# Patient Record
Sex: MALE | Race: BLACK OR AFRICAN AMERICAN | Employment: OTHER | URBAN - METROPOLITAN AREA
[De-identification: names, ages, dates, MRNs, and addresses within clinical notes are randomized per-mention and may not be internally consistent; named-entity substitution may affect disease eponyms.]

---

## 2017-01-30 ENCOUNTER — HOSPITAL ENCOUNTER (OUTPATIENT)
Dept: MRI IMAGING | Age: 57
Discharge: HOME | End: 2017-01-30
Attending: SPECIALIST | Admitting: SPECIALIST

## 2017-01-30 DIAGNOSIS — M25.569 KNEE MENISCUS PAIN, UNSPECIFIED LATERALITY: ICD-10-CM

## 2017-01-30 DIAGNOSIS — M25.569 PAIN IN KNEE: ICD-10-CM

## 2017-06-30 ENCOUNTER — OFFICE VISIT (OUTPATIENT)
Dept: ORTHOPEDIC SURGERY | Age: 57
End: 2017-06-30

## 2017-06-30 VITALS
RESPIRATION RATE: 16 BRPM | SYSTOLIC BLOOD PRESSURE: 131 MMHG | BODY MASS INDEX: 44.2 KG/M2 | WEIGHT: 275 LBS | HEIGHT: 66 IN | DIASTOLIC BLOOD PRESSURE: 89 MMHG | HEART RATE: 83 BPM | TEMPERATURE: 98.7 F

## 2017-06-30 DIAGNOSIS — M75.22 BICEPS TENDINITIS ON LEFT: ICD-10-CM

## 2017-06-30 DIAGNOSIS — Z72.0 TOBACCO USE: ICD-10-CM

## 2017-06-30 DIAGNOSIS — M75.82 ROTATOR CUFF TENDINITIS, LEFT: Primary | ICD-10-CM

## 2017-06-30 DIAGNOSIS — E66.01 MORBID OBESITY, UNSPECIFIED OBESITY TYPE (HCC): ICD-10-CM

## 2017-06-30 PROCEDURE — G8427 DOCREV CUR MEDS BY ELIG CLIN: HCPCS | Performed by: ORTHOPAEDIC SURGERY

## 2017-06-30 PROCEDURE — 20610 DRAIN/INJ JOINT/BURSA W/O US: CPT | Performed by: ORTHOPAEDIC SURGERY

## 2017-06-30 PROCEDURE — G8417 CALC BMI ABV UP PARAM F/U: HCPCS | Performed by: ORTHOPAEDIC SURGERY

## 2017-06-30 PROCEDURE — 3017F COLORECTAL CA SCREEN DOC REV: CPT | Performed by: ORTHOPAEDIC SURGERY

## 2017-06-30 PROCEDURE — 4004F PT TOBACCO SCREEN RCVD TLK: CPT | Performed by: ORTHOPAEDIC SURGERY

## 2017-06-30 PROCEDURE — 73030 X-RAY EXAM OF SHOULDER: CPT | Performed by: ORTHOPAEDIC SURGERY

## 2017-06-30 PROCEDURE — 99204 OFFICE O/P NEW MOD 45 MIN: CPT | Performed by: ORTHOPAEDIC SURGERY

## 2017-06-30 RX ORDER — IBUPROFEN 200 MG
400 TABLET ORAL EVERY 6 HOURS PRN
COMMUNITY
End: 2019-07-29

## 2017-06-30 RX ORDER — TRAMADOL HYDROCHLORIDE 50 MG/1
50 TABLET ORAL EVERY 6 HOURS PRN
COMMUNITY
End: 2022-02-21

## 2018-06-01 ENCOUNTER — OFFICE VISIT (OUTPATIENT)
Dept: ENT CLINIC | Age: 58
End: 2018-06-01

## 2018-06-01 VITALS
WEIGHT: 268 LBS | HEART RATE: 92 BPM | BODY MASS INDEX: 43.07 KG/M2 | DIASTOLIC BLOOD PRESSURE: 85 MMHG | HEIGHT: 66 IN | SYSTOLIC BLOOD PRESSURE: 120 MMHG

## 2018-06-01 DIAGNOSIS — H91.93 BILATERAL HEARING LOSS, UNSPECIFIED HEARING LOSS TYPE: ICD-10-CM

## 2018-06-01 DIAGNOSIS — H93.13 SUBJECTIVE TINNITUS OF BOTH EARS: Primary | ICD-10-CM

## 2018-06-01 PROCEDURE — 4004F PT TOBACCO SCREEN RCVD TLK: CPT | Performed by: OTOLARYNGOLOGY

## 2018-06-01 PROCEDURE — G8417 CALC BMI ABV UP PARAM F/U: HCPCS | Performed by: OTOLARYNGOLOGY

## 2018-06-01 PROCEDURE — G8427 DOCREV CUR MEDS BY ELIG CLIN: HCPCS | Performed by: OTOLARYNGOLOGY

## 2018-06-01 PROCEDURE — 99203 OFFICE O/P NEW LOW 30 MIN: CPT | Performed by: OTOLARYNGOLOGY

## 2018-06-01 PROCEDURE — 3017F COLORECTAL CA SCREEN DOC REV: CPT | Performed by: OTOLARYNGOLOGY

## 2018-06-01 RX ORDER — LOSARTAN POTASSIUM AND HYDROCHLOROTHIAZIDE 25; 100 MG/1; MG/1
TABLET ORAL
Refills: 4 | COMMUNITY
Start: 2018-04-08

## 2018-06-01 RX ORDER — AMLODIPINE BESYLATE 10 MG/1
TABLET ORAL NIGHTLY
Refills: 4 | COMMUNITY
Start: 2018-04-08

## 2018-06-05 ENCOUNTER — OFFICE VISIT (OUTPATIENT)
Dept: AUDIOLOGY | Age: 58
End: 2018-06-05

## 2018-06-05 DIAGNOSIS — H93.13 TINNITUS OF BOTH EARS: Primary | ICD-10-CM

## 2018-06-05 PROCEDURE — 92557 COMPREHENSIVE HEARING TEST: CPT | Performed by: AUDIOLOGIST

## 2018-06-05 PROCEDURE — 92550 TYMPANOMETRY & REFLEX THRESH: CPT | Performed by: AUDIOLOGIST

## 2018-06-29 ENCOUNTER — OFFICE VISIT (OUTPATIENT)
Dept: ENT CLINIC | Age: 58
End: 2018-06-29

## 2018-06-29 VITALS — SYSTOLIC BLOOD PRESSURE: 126 MMHG | DIASTOLIC BLOOD PRESSURE: 80 MMHG | HEART RATE: 69 BPM

## 2018-06-29 DIAGNOSIS — H93.13 SUBJECTIVE TINNITUS OF BOTH EARS: ICD-10-CM

## 2018-06-29 DIAGNOSIS — H90.3 BILATERAL SENSORINEURAL HEARING LOSS: Primary | ICD-10-CM

## 2018-06-29 PROCEDURE — 3017F COLORECTAL CA SCREEN DOC REV: CPT | Performed by: OTOLARYNGOLOGY

## 2018-06-29 PROCEDURE — G8427 DOCREV CUR MEDS BY ELIG CLIN: HCPCS | Performed by: OTOLARYNGOLOGY

## 2018-06-29 PROCEDURE — 4004F PT TOBACCO SCREEN RCVD TLK: CPT | Performed by: OTOLARYNGOLOGY

## 2018-06-29 PROCEDURE — 99214 OFFICE O/P EST MOD 30 MIN: CPT | Performed by: OTOLARYNGOLOGY

## 2018-06-29 PROCEDURE — G8417 CALC BMI ABV UP PARAM F/U: HCPCS | Performed by: OTOLARYNGOLOGY

## 2018-07-05 ENCOUNTER — HOSPITAL ENCOUNTER (OUTPATIENT)
Dept: PHYSICAL THERAPY | Age: 58
Discharge: HOME | End: 2018-07-31
Admitting: ORTHOPAEDIC SURGERY

## 2018-07-12 ENCOUNTER — HOSPITAL ENCOUNTER (OUTPATIENT)
Dept: PHYSICAL THERAPY | Age: 58
Discharge: HOME | End: 2018-07-13
Admitting: ORTHOPAEDIC SURGERY

## 2018-07-12 NOTE — PLAN OF CARE
[address], [address], [address]  Phone: [phone]   Fax: [phone]                                                     Physical Therapy Certification    Dear Referring Practitioner: Dr. Bert De La Vega,    We had the pleasure of evaluating the following patient for physical therapy services at [address] [place] [address]. A summary of our findings can be found in the initial assessment below. This includes our plan of care. If you have any questions or concerns regarding these findings, please do not hesitate to contact me at the office phone number checked above. Thank you for the referral.       Physician Signature:_______________________________Date:__________________  By signing above (or electronic signature), therapists plan is approved by physician      Patient: Hema Husain   : 1960   MRN: [id number]  Referring Physician: Referring Practitioner: Dr. Bert De La Vega      Evaluation Date: 2018      Medical Diagnosis Information:  Diagnosis: AC joint arthritis   Treatment Diagnosis: S38.533 / A24.342 - B shoulder pain; M25.611 / M25.612 - B shoulder stiffness                                         Insurance information: PT Insurance Information: Medicare / Medicaid - $2010 cap, no auth    Precautions/ Contra-indications: n/a  Latex Allergy:  [x]NO      []YES  Preferred Language for Healthcare:   [x]English       []other:    SUBJECTIVE: Patient stated complaint: B shoulder pain for several months, \"but it seems like it is getting worse. \" per pt, R is worse than L. Pt reports know known incident or cause. Per pt, a week before MD appt, bumped shoulder on the wall and felt R shoulder pop, pain worse since then. Reports constant pain that is worse with certain movements / activities, francisco reaching and lifting.  Reports numbness down to fingertips when he lies down - has to get up and let arm hang down to get relief of numbness. Pt has not tried ice. Pt had a cortisone injection in R shoulder on 7/6/18 - per pt, injection was helping until he lost his balance on the steps and hit his R shoulder on the wall. Relevant Medical History:  OA     Functional Disability Index:PT G-Codes  Functional Assessment Tool Used: UEFI  Score: 41/80 = 49% LOF  Functional Limitation: Carrying, moving and handling objects  Carrying, Moving and Handling Objects Current Status (): At least 40 percent but less than 60 percent impaired, limited or restricted  Carrying, Moving and Handling Objects Goal Status (): At least 1 percent but less than 20 percent impaired, limited or restricted    Pain Scale: 8/10 with movement  Easing factors: Ibuprofen, Tramadol  Provocative factors: reaching forward, reaching back, trying to lift anything     Type: [x]Constant   []Intermittent  []Radiating []Localized []other:     Numbness/Tingling: down arm to fingertips most when lying down, but occasionally when sitting. Seems to happen with inactivity. Reports gets it on L too, but not as bad as right. Occupation/School: disabled  Living Status/Prior Level of Function: Prior to this injury / incident, pt was independent with ADLs and IADLs, household chores, reaching, and lifting      OBJECTIVE:   Hand dominance: right     Palpation:   L shoulder:   R shoulder: (+) moderate to severe TTP in anterior and lateral shoulder, just distal to acromion (francisco at coracoid process).      Functional Mobility/Transfers: unable to use UEs to assist    Posture: rounded shoulders B; moderate increase in thoracic kyphosis    Bandages/Dressings/Incisions: n/a      Dermatomes Normal Abnormal Comments   Top of head (C1)   nt   Posterior occipital region (C2)   nt   Side of neck (C3)   nt   Top of shoulder (C4)  x Diminished sensation on R   Lateral deltoid (C5)  x Diminished sensation on R   Tip of thumb (C6)  x Diminished sensation on R   Distal shoulder/elbow/wrist tendinopathy   []Signs/symptoms consistent with Rotator cuff tear   []Signs/symptoms consistent with labral tear   []Signs/symptoms consistent with postural dysfunction    []Signs/symptoms consistent with Glenohumeral IR Deficit - <45 degrees   []Signs/symptoms consistent with facet dysfunction of cervical/thoracic spine    []Signs/symptoms consistent with pathology which may benefit from Dry needling     [x]other: signs/symptoms consistent with A-C joint arthritis and adhesive capsulitis    Prognosis/Rehab Potential:      []Excellent   [x]Good    []Fair   []Poor    Tolerance of evaluation/treatment:    []Excellent   [x]Good    []Fair   []Poor    Physical Therapy Evaluation Complexity Justification  [x] A history of present problem with:  [] no personal factors and/or comorbidities that impact the plan of care;  [x]1-2 personal factors and/or comorbidities that impact the plan of care  []3 personal factors and/or comorbidities that impact the plan of care  [x] An examination of body systems using standardized tests and measures addressing any of the following: body structures and functions (impairments), activity limitations, and/or participation restrictions;:  [] a total of 1-2 or more elements   [] a total of 3 or more elements   [x] a total of 4 or more elements   [x] A clinical presentation with:  [x] stable and/or uncomplicated characteristics   [] evolving clinical presentation with changing characteristics  [] unstable and unpredictable characteristics;   [x] Clinical decision making of [x] low, [] moderate, [] high complexity using standardized patient assessment instrument and/or measurable assessment of functional outcome.     [x] EVAL (LOW) 49892 (typically 15 minutes face-to-face)  [] EVAL (MOD) 44550 (typically 30 minutes face-to-face)  [] EVAL (HIGH) 52527 (typically 45 minutes face-to-face)  [] RE-EVAL     PLAN:  Frequency/Duration:  2 days per week for 6-8

## 2018-07-17 ENCOUNTER — HOSPITAL ENCOUNTER (OUTPATIENT)
Dept: PHYSICAL THERAPY | Age: 58
Discharge: HOME | End: 2018-07-18
Admitting: ORTHOPAEDIC SURGERY

## 2018-07-17 NOTE — FLOWSHEET NOTE
including ADLs and household chores including lifting with arms close to body without increased symptoms or restriction. 5. Patient will be able to reach overhead and behind back without increased symptoms or restriction. 6. Patient will be able to sleep through the night without being awakened by pain or numbness. Progression Towards Functional goals:  [] Patient is progressing as expected towards functional goals listed. [] Progression is slowed due to complexities listed. [] Progression has been slowed due to co-morbidities. [x] Plan just implemented, too soon to assess goals progression  [] Other:     Persisting Functional Limitations/Impairments:  []Sitting []Standing   []Walking []Squatting/bending    []Stairs [x]ADL's    [x]Transfers [x]Reaching  [x]Housework []Job related tasks  [x]Driving [x]Sports/Recreation   [x]Other: lifting, sleeping    ASSESSMENT:  Pt tolerated tx well. Reported pain in R shoulder with 5# bicep curls, so decreased to 3# and able to complete without pain. PROM is gradually increasing, but continues to be painful, francisco at end range. Pt more tender / painful with manual tx on the R than the L. Pt encouraged to continue compliance with HEP. Pt will continue to benefit from skilled PT for ROM, strength and NM re-ed in order to improve mobility, improve reaching / lifting and increase overall function.    Treatment/Activity Tolerance:  [x] Patient tolerated treatment well [] Patient limited by fatique  [x] Patient limited by pain  [] Patient limited by other medical complications  [] Other:     Prognosis: [x] Good [] Fair  [] Poor    Patient Requires Follow-up: [x] Yes  [] No    Return to Play:    [x]  N/A     []  Stage 1: Intro to Strength   []  Stage 2: Dynamic Strength and Intro to Plyometrics   []  Stage 3: Advanced Plyometrics and Intro to Throwing   []  Stage 4: Sport specific Training/Return to Sport     []  Ready to Return to Play, Meets [employer] [facility] [employer]   []  Not

## 2018-07-19 ENCOUNTER — HOSPITAL ENCOUNTER (OUTPATIENT)
Dept: PHYSICAL THERAPY | Age: 58
Discharge: HOME | End: 2018-07-20
Admitting: ORTHOPAEDIC SURGERY

## 2018-08-01 ENCOUNTER — HOSPITAL ENCOUNTER (OUTPATIENT)
Dept: PHYSICAL THERAPY | Age: 58
Discharge: HOME | End: 2018-08-31
Attending: ORTHOPAEDIC SURGERY | Admitting: ORTHOPAEDIC SURGERY

## 2019-04-08 ENCOUNTER — OFFICE VISIT (OUTPATIENT)
Dept: ORTHOPEDIC SURGERY | Age: 59
End: 2019-04-08
Payer: MEDICARE

## 2019-04-08 VITALS
DIASTOLIC BLOOD PRESSURE: 85 MMHG | BODY MASS INDEX: 43.87 KG/M2 | SYSTOLIC BLOOD PRESSURE: 124 MMHG | WEIGHT: 273 LBS | HEIGHT: 66 IN | HEART RATE: 80 BPM

## 2019-04-08 DIAGNOSIS — M25.511 BILATERAL SHOULDER PAIN, UNSPECIFIED CHRONICITY: ICD-10-CM

## 2019-04-08 DIAGNOSIS — M75.81 RIGHT ROTATOR CUFF TENDINITIS: Primary | ICD-10-CM

## 2019-04-08 DIAGNOSIS — M25.512 BILATERAL SHOULDER PAIN, UNSPECIFIED CHRONICITY: ICD-10-CM

## 2019-04-08 DIAGNOSIS — M75.82 ROTATOR CUFF TENDINITIS, LEFT: ICD-10-CM

## 2019-04-08 DIAGNOSIS — M19.011 ARTHRITIS OF RIGHT ACROMIOCLAVICULAR JOINT: ICD-10-CM

## 2019-04-08 PROCEDURE — 4004F PT TOBACCO SCREEN RCVD TLK: CPT | Performed by: ORTHOPAEDIC SURGERY

## 2019-04-08 PROCEDURE — G8417 CALC BMI ABV UP PARAM F/U: HCPCS | Performed by: ORTHOPAEDIC SURGERY

## 2019-04-08 PROCEDURE — 3017F COLORECTAL CA SCREEN DOC REV: CPT | Performed by: ORTHOPAEDIC SURGERY

## 2019-04-08 PROCEDURE — 20610 DRAIN/INJ JOINT/BURSA W/O US: CPT | Performed by: ORTHOPAEDIC SURGERY

## 2019-04-08 PROCEDURE — G8427 DOCREV CUR MEDS BY ELIG CLIN: HCPCS | Performed by: ORTHOPAEDIC SURGERY

## 2019-04-08 PROCEDURE — 99213 OFFICE O/P EST LOW 20 MIN: CPT | Performed by: ORTHOPAEDIC SURGERY

## 2019-04-08 NOTE — PROGRESS NOTES
The patient returns today for both shoulders. He's been diagnosed with passive a.c. arthrosis and impingement on the right and impingement on the left. He says is getting the point where he can't sleep at night on either shoulder. ROS: Pertinent items are noted in HPI. Depo medrol NDC 4499-1041-42  Lot# [id number]  Exp.1/2021  Shoulder,bilateral    Ropivacaine    NCD#:  [id number]  LOT#:  [id number]  Exp Date:  05/22  Shoulder, bilateral          History reviewed. No pertinent past medical history. History reviewed. No pertinent surgical history. History reviewed. No pertinent family history.       Social History    Socioeconomic History      Marital status: Legally       Spouse name: None      Number of children: None      Years of education: None      Highest education level: None    Occupational History      None    Social Needs      Financial resource strain: None      Food insecurity:        Worry: None        Inability: None      Transportation needs:        Medical: None        Non-medical: None    Tobacco Use      Smoking status: Current Every Day Smoker        Packs/day: 1.00        Years: 41.00        Pack years: 39        Types: Cigarettes        Start date: 1977      Smokeless tobacco: Never Used    Substance and Sexual Activity      Alcohol use: None      Drug use: None      Sexual activity: None    Lifestyle      Physical activity:        Days per week: None        Minutes per session: None      Stress: None    Relationships      Social connections:        Talks on phone: None        Gets together: None        Attends Muslim service: None        Active member of club or organization: None        Attends meetings of clubs or organizations: None        Relationship status: None      Intimate partner violence:        Fear of current or ex partner: None        Emotionally abused: None        Physically abused: None        Forced sexual activity: None    Other Topics      Concerns: tendinitis. Plan: After sterile prep of the right shoulder injected with 80 mg of Depo-Medrol and 15 mg ropivacaine split between a.c. joint and subacromial space. Patient tolerated this procedure well. After sterile prep injected left shoulder with 80 mg of Depo-Medrol and 50 mg ropivacaine into the subacromial space. Patient tolerated this procedure well.

## 2019-05-01 ENCOUNTER — HOSPITAL ENCOUNTER (OUTPATIENT)
Age: 59
Discharge: HOME | End: 2019-05-01
Payer: MEDICARE

## 2019-05-01 ENCOUNTER — HOSPITAL ENCOUNTER (OUTPATIENT)
Dept: GENERAL RADIOLOGY | Age: 59
Discharge: HOME | End: 2019-05-01
Payer: MEDICARE

## 2019-05-01 DIAGNOSIS — R05.9 COUGH: ICD-10-CM

## 2019-05-01 DIAGNOSIS — Z20.1 EXPOSURE TO TB: ICD-10-CM

## 2019-05-01 PROCEDURE — 86480 TB TEST CELL IMMUN MEASURE: CPT

## 2019-05-01 PROCEDURE — 71046 X-RAY EXAM CHEST 2 VIEWS: CPT

## 2019-05-03 LAB
QUANTI TB GOLD PLUS: NEGATIVE
QUANTI TB1 MINUS NIL: 0.03 IU/ML (ref 0–0.34)
QUANTI TB2 MINUS NIL: 0.03 IU/ML (ref 0–0.34)
QUANTIFERON MITOGEN: >10 IU/ML
QUANTIFERON NIL: 0.03 IU/ML

## 2019-05-12 ENCOUNTER — HOSPITAL ENCOUNTER (EMERGENCY)
Age: 59
Discharge: HOME | End: 2019-05-12
Attending: EMERGENCY MEDICINE
Payer: MEDICARE

## 2019-05-12 VITALS
TEMPERATURE: 98.2 F | RESPIRATION RATE: 20 BRPM | BODY MASS INDEX: 43.87 KG/M2 | DIASTOLIC BLOOD PRESSURE: 81 MMHG | HEART RATE: 80 BPM | WEIGHT: 273 LBS | OXYGEN SATURATION: 97 % | SYSTOLIC BLOOD PRESSURE: 128 MMHG | HEIGHT: 66 IN

## 2019-05-12 DIAGNOSIS — M54.30 SCIATICA, UNSPECIFIED LATERALITY: Primary | ICD-10-CM

## 2019-05-12 PROCEDURE — 96374 THER/PROPH/DIAG INJ IV PUSH: CPT

## 2019-05-12 PROCEDURE — 2500000003 HC RX 250 WO HCPCS: Performed by: EMERGENCY MEDICINE

## 2019-05-12 PROCEDURE — 99282 EMERGENCY DEPT VISIT SF MDM: CPT

## 2019-05-12 PROCEDURE — 6360000002 HC RX W HCPCS: Performed by: EMERGENCY MEDICINE

## 2019-05-12 PROCEDURE — 96375 TX/PRO/DX INJ NEW DRUG ADDON: CPT

## 2019-05-12 RX ORDER — MELOXICAM 15 MG/1
15 TABLET ORAL DAILY
COMMUNITY
End: 2019-07-29

## 2019-05-12 RX ORDER — HYDROMORPHONE HYDROCHLORIDE 1 MG/ML
0.5 INJECTION, SOLUTION INTRAMUSCULAR; INTRAVENOUS; SUBCUTANEOUS ONCE
Status: COMPLETED | OUTPATIENT
Start: 2019-05-12 | End: 2019-05-12

## 2019-05-12 RX ORDER — DEXAMETHASONE SODIUM PHOSPHATE 4 MG/ML
4 INJECTION, SOLUTION INTRA-ARTICULAR; INTRALESIONAL; INTRAMUSCULAR; INTRAVENOUS; SOFT TISSUE EVERY 6 HOURS
Status: DISCONTINUED | OUTPATIENT
Start: 2019-05-12 | End: 2019-05-12 | Stop reason: HOSPADM

## 2019-05-12 RX ORDER — CYCLOBENZAPRINE HCL 10 MG
10 TABLET ORAL 3 TIMES DAILY PRN
COMMUNITY
End: 2019-07-29 | Stop reason: ALTCHOICE

## 2019-05-12 RX ADMIN — HYDROMORPHONE HYDROCHLORIDE 0.5 MG: 1 INJECTION, SOLUTION INTRAMUSCULAR; INTRAVENOUS; SUBCUTANEOUS at 02:58

## 2019-05-12 RX ADMIN — DEXAMETHASONE SODIUM PHOSPHATE 4 MG: 4 INJECTION, SOLUTION INTRAMUSCULAR; INTRAVENOUS at 02:58

## 2019-05-12 ASSESSMENT — PAIN DESCRIPTION - PAIN TYPE
TYPE: ACUTE PAIN
TYPE: ACUTE PAIN

## 2019-05-12 ASSESSMENT — PAIN DESCRIPTION - LOCATION: LOCATION: BACK

## 2019-05-12 ASSESSMENT — PAIN DESCRIPTION - ORIENTATION: ORIENTATION: LOWER

## 2019-05-12 ASSESSMENT — PAIN SCALES - GENERAL
PAINLEVEL_OUTOF10: 10
PAINLEVEL_OUTOF10: 6

## 2019-05-12 NOTE — ED PROVIDER NOTES
eMERGENCY dEPARTMENT eNCOUnter      [address] [facility]    Chief Complaint   Patient presents with    Back Pain     Patient presents to ER with complaints of lower back pain that radiates down left leg. Denies urinary symptoms. Patient was seen at [facility], given meloxicam and cyclobezaprine; no relief. JUVE Wu is a 63 y.o. male who presents with low back pain that radiates down the left lower extremity. He states that this hasn't gone over couple days he was seen at the [facility] and given some medication without any relief. He has no weakness in extremities or difficulty urinating. He's never had a history of cancer, no fever, no trauma of the recent fracture. PAST MEDICAL HISTORY    History reviewed. No pertinent past medical history. SURGICAL HISTORY    History reviewed. No pertinent surgical history. CURRENT MEDICATIONS    Current Outpatient Rx   Medication Sig Dispense Refill    cyclobenzaprine (FLEXERIL) 10 MG tablet Take 10 mg by mouth 3 times daily as needed for Muscle spasms      meloxicam (MOBIC) 15 MG tablet Take 15 mg by mouth daily      Ascorbic Acid Buffered (BUFFERED C POWDER PO) Take by mouth      amLODIPine (NORVASC) 10 MG tablet TK 1 T PO QD  4    losartan-hydrochlorothiazide (HYZAAR) 100-25 MG per tablet TK 1 T PO QD  4    diclofenac sodium 1 % GEL MATT 1 GRAM BID  2    vitamin D (CHOLECALCIFEROL) 1000 UNIT TABS tablet Take 1,000 Units by mouth daily      traMADol (ULTRAM) 50 MG tablet Take 50 mg by mouth every 6 hours as needed for Pain      ibuprofen (ADVIL;MOTRIN) 200 MG tablet Take 400 mg by mouth every 6 hours as needed for Pain         ALLERGIES    No Known Allergies    FAMILY HISTORY    History reviewed. No pertinent family history.   Family history and social history reviewed and noncontributory  SOCIAL HISTORY    Social History     Socioeconomic History    Marital status: Legally      Spouse name: None    Number of children: None    Years of education: None    Highest education level: None   Occupational History    None   Social Needs    Financial resource strain: None    Food insecurity:     Worry: None     Inability: None    Transportation needs:     Medical: None     Non-medical: None   Tobacco Use    Smoking status: Current Every Day Smoker     Packs/day: 1.00     Years: 41.00     Pack years: 41.00     Types: Cigarettes     Start date: 1977    Smokeless tobacco: Never Used   Substance and Sexual Activity    Alcohol use: None    Drug use: None    Sexual activity: None   Lifestyle    Physical activity:     Days per week: None     Minutes per session: None    Stress: None   Relationships    Social connections:     Talks on phone: None     Gets together: None     Attends Catholic service: None     Active member of club or organization: None     Attends meetings of clubs or organizations: None     Relationship status: None    Intimate partner violence:     Fear of current or ex partner: None     Emotionally abused: None     Physically abused: None     Forced sexual activity: None   Other Topics Concern    None   Social History Narrative    None       REVIEW OF SYSTEMS    Constitutional:  Denies fever, chills, weight loss or weakness   Eyes:  Denies photophobia or discharge   HENT:  Denies sore throat or ear pain   Respiratory:  Denies cough or shortness of breath   Cardiovascular:  Denies chest pain, palpitations or swelling   GI:  Denies abdominal pain, nausea, vomiting, or diarrhea   Musculoskeletal:  Sharp back pain   Skin:  Denies rash   Neurologic:  Denies headache, focal weakness or sensory changes   Endocrine:  Denies polyuria or polydypsia   Lymphatic:  Denies swollen glands   Psychiatric:  Denies depression, suicidal ideation or homicidal ideation   All systems negative except as marked.      PHYSICAL EXAM    VITAL SIGNS: /81   Pulse 80   Temp 98.2 °F (36.8 °C) (Oral)   Resp 20   Ht 5' 6\" (1.676 m)   Wt 273 lb (123.8 kg)   SpO2 97%   BMI 44.06 kg/m²    Constitutional:  Well developed, Well nourished, No acute distress, Non-toxic appearance. HENT:  Normocephalic, Atraumatic, Bilateral external ears normal, Oropharynx moist, No oral exudates, Nose normal. Neck- Normal range of motion, No tenderness, Supple, No stridor. Eyes:  PERRL, EOMI, Conjunctiva normal, No discharge. Respiratory:  Normal breath sounds, No respiratory distress, No wheezing, No chest tenderness. Cardiovascular:  Normal heart rate, Normal rhythm, No murmurs, No rubs, No gallops. GI:  Bowel sounds normal, Soft, No tenderness, No masses, No pulsatile masses. Musculoskeletal:  Intact distal pulses, No edema, No tenderness, No cyanosis, No clubbing. Good range of motion in all major joints. No tenderness to palpation or major deformities noted. Back- No tenderness. Integument:  Warm, Dry, No erythema, No rash. Lymphatic:  No lymphadenopathy noted. Neurologic:  Alert & oriented x 3, Normal motor function, Normal sensory function, No focal deficits noted. Psychiatric:  Affect normal, Judgment normal, Mood normal.     EKG        RADIOLOGY        PROCEDURES        ED COURSE & MEDICAL DECISION MAKING    Pertinent Labs & Imaging studies reviewed. (See chart for details)  Clinically it is because sciatica. I gave some IV analgesia here in the emergency department. This patient has some sharp left-sided sciatica. He does not have any sign of serious back pain such as fever, weakness in extremity, or history of cancer. I think he can be managed as an outpatient he already has medicine for pain. Follow-up with primary care  FINAL IMPRESSION    1.  Sciatica, unspecified laterality             Luther Ngo MD  05/12/19 2516

## 2019-06-06 ENCOUNTER — OFFICE VISIT (OUTPATIENT)
Dept: ORTHOPEDIC SURGERY | Age: 59
End: 2019-06-06
Payer: MEDICARE

## 2019-06-06 VITALS
DIASTOLIC BLOOD PRESSURE: 53 MMHG | HEIGHT: 65 IN | BODY MASS INDEX: 44.15 KG/M2 | WEIGHT: 265 LBS | HEART RATE: 93 BPM | SYSTOLIC BLOOD PRESSURE: 113 MMHG

## 2019-06-06 DIAGNOSIS — M51.37 DEGENERATION OF LUMBAR OR LUMBOSACRAL INTERVERTEBRAL DISC: ICD-10-CM

## 2019-06-06 DIAGNOSIS — M25.552 PAIN OF LEFT HIP JOINT: Primary | ICD-10-CM

## 2019-06-06 PROCEDURE — 3017F COLORECTAL CA SCREEN DOC REV: CPT | Performed by: ORTHOPAEDIC SURGERY

## 2019-06-06 PROCEDURE — 4004F PT TOBACCO SCREEN RCVD TLK: CPT | Performed by: ORTHOPAEDIC SURGERY

## 2019-06-06 PROCEDURE — 99213 OFFICE O/P EST LOW 20 MIN: CPT | Performed by: ORTHOPAEDIC SURGERY

## 2019-06-06 PROCEDURE — G8417 CALC BMI ABV UP PARAM F/U: HCPCS | Performed by: ORTHOPAEDIC SURGERY

## 2019-06-06 PROCEDURE — G8427 DOCREV CUR MEDS BY ELIG CLIN: HCPCS | Performed by: ORTHOPAEDIC SURGERY

## 2019-06-06 RX ORDER — METHYLPREDNISOLONE 4 MG/1
TABLET ORAL
Qty: 1 KIT | Refills: 1 | Status: SHIPPED | OUTPATIENT
Start: 2019-06-06 | End: 2019-06-12

## 2019-06-06 NOTE — PROGRESS NOTES
wishes and have on the right and he has some trochanteric tenderness. He has been more sciatic notch tenderness. He has tenderness in the left paraspinous area and little bit in the midline low lumbar spine. He has no swelling of the left lower extremity. Deep tendon reflexes appear normal at the knee and ankle on both sides. He obtained an AP of the pelvis and a lateral of the lumbar spine. Lumbar spine shows loss of lordosis. In the mid lumbar spine has normal joint spaces. He appears to have almost no joint space however T12-L1. The AP pelvis shows he has normal hip joint space no interosseous lesions or marginal osteophytes. Impression: Left lumbar degenerative disc disease with lumbar radiculopathy. Plan: We'll give this patient a Medrol Dosepak. He is to call and this is finished and if he does not have symptoms we will order a lumbar MRI. He does not feel is improved any in the last month.

## 2019-07-15 ENCOUNTER — OFFICE VISIT (OUTPATIENT)
Dept: ORTHOPEDIC SURGERY | Age: 59
End: 2019-07-15
Payer: MEDICARE

## 2019-07-15 VITALS
SYSTOLIC BLOOD PRESSURE: 140 MMHG | BODY MASS INDEX: 44.15 KG/M2 | DIASTOLIC BLOOD PRESSURE: 95 MMHG | HEART RATE: 76 BPM | HEIGHT: 65 IN | WEIGHT: 265 LBS

## 2019-07-15 DIAGNOSIS — M47.816 FACET ARTHRITIS, DEGENERATIVE, LUMBAR SPINE: ICD-10-CM

## 2019-07-15 DIAGNOSIS — M54.16 LUMBAR RADICULOPATHY: Primary | ICD-10-CM

## 2019-07-15 DIAGNOSIS — M25.552 PAIN OF LEFT HIP JOINT: ICD-10-CM

## 2019-07-15 DIAGNOSIS — M51.36 DEGENERATIVE LUMBAR DISC: ICD-10-CM

## 2019-07-15 DIAGNOSIS — M25.562 LEFT KNEE PAIN, UNSPECIFIED CHRONICITY: ICD-10-CM

## 2019-07-15 PROCEDURE — 99214 OFFICE O/P EST MOD 30 MIN: CPT | Performed by: ORTHOPAEDIC SURGERY

## 2019-07-15 PROCEDURE — G8427 DOCREV CUR MEDS BY ELIG CLIN: HCPCS | Performed by: ORTHOPAEDIC SURGERY

## 2019-07-15 PROCEDURE — 4004F PT TOBACCO SCREEN RCVD TLK: CPT | Performed by: ORTHOPAEDIC SURGERY

## 2019-07-15 PROCEDURE — 3017F COLORECTAL CA SCREEN DOC REV: CPT | Performed by: ORTHOPAEDIC SURGERY

## 2019-07-15 PROCEDURE — G8417 CALC BMI ABV UP PARAM F/U: HCPCS | Performed by: ORTHOPAEDIC SURGERY

## 2019-07-22 ENCOUNTER — HOSPITAL ENCOUNTER (OUTPATIENT)
Dept: MRI IMAGING | Age: 59
Discharge: HOME | End: 2019-07-22
Payer: MEDICARE

## 2019-07-22 DIAGNOSIS — M54.16 LUMBAR RADICULOPATHY: ICD-10-CM

## 2019-07-22 PROCEDURE — 72148 MRI LUMBAR SPINE W/O DYE: CPT

## 2019-07-29 ENCOUNTER — OFFICE VISIT (OUTPATIENT)
Dept: ORTHOPEDIC SURGERY | Age: 59
End: 2019-07-29
Payer: MEDICARE

## 2019-07-29 VITALS
HEIGHT: 66 IN | SYSTOLIC BLOOD PRESSURE: 114 MMHG | DIASTOLIC BLOOD PRESSURE: 71 MMHG | HEART RATE: 86 BPM | WEIGHT: 264 LBS | BODY MASS INDEX: 42.43 KG/M2

## 2019-07-29 DIAGNOSIS — M25.562 LEFT KNEE PAIN, UNSPECIFIED CHRONICITY: ICD-10-CM

## 2019-07-29 DIAGNOSIS — M51.36 DEGENERATIVE LUMBAR DISC: Primary | ICD-10-CM

## 2019-07-29 DIAGNOSIS — M25.552 PAIN OF LEFT HIP JOINT: ICD-10-CM

## 2019-07-29 PROCEDURE — 4004F PT TOBACCO SCREEN RCVD TLK: CPT | Performed by: ORTHOPAEDIC SURGERY

## 2019-07-29 PROCEDURE — G8417 CALC BMI ABV UP PARAM F/U: HCPCS | Performed by: ORTHOPAEDIC SURGERY

## 2019-07-29 PROCEDURE — 99212 OFFICE O/P EST SF 10 MIN: CPT | Performed by: ORTHOPAEDIC SURGERY

## 2019-07-29 PROCEDURE — 3017F COLORECTAL CA SCREEN DOC REV: CPT | Performed by: ORTHOPAEDIC SURGERY

## 2019-07-29 PROCEDURE — G8427 DOCREV CUR MEDS BY ELIG CLIN: HCPCS | Performed by: ORTHOPAEDIC SURGERY

## 2019-07-29 RX ORDER — IBUPROFEN 600 MG/1
TABLET ORAL
Refills: 0 | COMMUNITY
Start: 2019-06-11 | End: 2020-05-11 | Stop reason: ALTCHOICE

## 2019-07-31 ENCOUNTER — OFFICE VISIT (OUTPATIENT)
Dept: ORTHOPEDIC SURGERY | Age: 59
End: 2019-07-31
Payer: MEDICARE

## 2019-07-31 VITALS
DIASTOLIC BLOOD PRESSURE: 86 MMHG | HEIGHT: 66 IN | RESPIRATION RATE: 14 BRPM | WEIGHT: 263.89 LBS | SYSTOLIC BLOOD PRESSURE: 144 MMHG | BODY MASS INDEX: 42.41 KG/M2 | HEART RATE: 93 BPM

## 2019-07-31 DIAGNOSIS — M47.816 SPONDYLOSIS WITHOUT MYELOPATHY OR RADICULOPATHY, LUMBAR REGION: ICD-10-CM

## 2019-07-31 DIAGNOSIS — M51.26 HNP (HERNIATED NUCLEUS PULPOSUS), LUMBAR: Primary | ICD-10-CM

## 2019-07-31 DIAGNOSIS — M54.16 LUMBAR RADICULOPATHY: ICD-10-CM

## 2019-07-31 PROCEDURE — G8417 CALC BMI ABV UP PARAM F/U: HCPCS | Performed by: PHYSICAL MEDICINE & REHABILITATION

## 2019-07-31 PROCEDURE — 99204 OFFICE O/P NEW MOD 45 MIN: CPT | Performed by: PHYSICAL MEDICINE & REHABILITATION

## 2019-07-31 PROCEDURE — G8427 DOCREV CUR MEDS BY ELIG CLIN: HCPCS | Performed by: PHYSICAL MEDICINE & REHABILITATION

## 2019-07-31 RX ORDER — TRAMADOL HYDROCHLORIDE 50 MG/1
50 TABLET ORAL 3 TIMES DAILY
Qty: 21 TABLET | Refills: 0 | Status: SHIPPED | OUTPATIENT
Start: 2019-07-31 | End: 2019-08-07

## 2019-07-31 NOTE — PROGRESS NOTES
Disp: , Rfl:   Allergies:  Patient has no known allergies. Social History:    reports that he has been smoking cigarettes. He started smoking about 42 years ago. He has a 41.00 pack-year smoking history. He has never used smokeless tobacco.  Family History:   History reviewed. No pertinent family history. REVIEW OF SYSTEMS: Full ROS reviewed & scanned from 7/31/2019           PHYSICAL EXAM:    Vitals: Blood pressure (!) 144/86, pulse 93, resp. rate 14, height 5' 5.98\" (1.676 m), weight 263 lb 14.3 oz (119.7 kg). GENERAL EXAM:  · General Apparence: Patient is adequately groomed with no evidence of malnutrition. · Psychiatric: Orientation: The patient is oriented to time, place and person. The patient's mood and affect are appropriate   · Vascular: Examination reveals no swelling and palpation reveals no tenderness in upper or lower extremities. Good capillary refill. · The lymphatic examination of the neck, axillae and groin reveals all areas to be without enlargement or induration   Sensation is intact without deficit in the upper and lower extremities to light touch and pinprick  · Coordination of the upper and lower extremities are normal.    CERVICAL EXAMINATION:  · Inspection: Local inspection shows no step-off or bruising. Cervical alignment is normal. No instability is noted. · Palpation and Percussion: No evidence of tenderness at the midline. Paraspinal tenderness is not present. There is no paraspinal spasm. · Range of Motion:  limited by 25% in all planes due to pain   · Strength: 5/5 bilateral upper extremities  · Special Tests:   Spurling's and Brady's are negative bilaterally. Barillas and Impingement tests are negative bilaterally. · Skin:There are no rashes, ulcerations or lesions. · Reflexes: Bilaterally triceps, biceps and brachioradialis are 2+. Clonus absent bilaterally at the feet. No pathological reflexes are noted.   · Gait & station:  antalgic on the left and no ataxia Rasmussen including:  Conservative care options: physical therapy, ice, medications, bracing, and activity modification. The indications for therapeutic injections. The indications for additional imaging/laboratory studies. The indications for (possible future) interventions. After considering the various options discussed, Storm Green elected to pursue a course of treatment that includes the followin. Medications: OARRS reviewed and appropriate. Low risk on ORT. 4 A's reviewed. Tramadol 50 mg po TID #21. Informed verbal consent was obtained. Goals of the current treatment regimen include: improvement in pain, improvement in overall in physical performance, ability to perform daily activities, work or disability, emotional distress, health care utilization and decreased medication consumption. Progress will be monitored towards achieving/maintaining therapeutic goals:    1. Improving perceived interference of pain with ADL's, ability to perform HEP, continue to improve in overall flexibility, ROM, strength and endurance, ability to do household activities indoor and/or outdoor, work and social/leisure activities. Improve psychosocial and physical functioning. 2. Improving sleep to 6-7 hours a night. Improve mood/ anxiety and depression symptoms    3. Reduction of reliance on opioid analgesia/more appropriate opioid use. Utilization of adjuvant medications as appropriate. Risks and benefits of the medications and alternative treatments have been discussed with the patient. The patient was advised against drinking alcohol with the opioid pain medicines, advised against driving or handling machinery when starting or adjusting the dose of medicines, feeling groggy or drowsy, or if having any cognitive issues related to the current medications. The patient is fully aware of the risk of overdose and death, if medicines are misused and not taken as prescribed.  If the patient develops new symptoms

## 2019-08-05 ENCOUNTER — TELEPHONE (OUTPATIENT)
Dept: ORTHOPEDIC SURGERY | Age: 59
End: 2019-08-05

## 2019-08-12 ENCOUNTER — HOSPITAL ENCOUNTER (OUTPATIENT)
Age: 59
Setting detail: OUTPATIENT SURGERY
Discharge: HOME | End: 2019-08-12
Attending: PHYSICAL MEDICINE & REHABILITATION | Admitting: PHYSICAL MEDICINE & REHABILITATION
Payer: MEDICARE

## 2019-08-12 ENCOUNTER — TELEPHONE (OUTPATIENT)
Dept: ORTHOPEDIC SURGERY | Age: 59
End: 2019-08-12

## 2019-08-12 ENCOUNTER — APPOINTMENT (OUTPATIENT)
Dept: GENERAL RADIOLOGY | Age: 59
End: 2019-08-12
Attending: PHYSICAL MEDICINE & REHABILITATION
Payer: MEDICARE

## 2019-08-12 VITALS
BODY MASS INDEX: 41.78 KG/M2 | TEMPERATURE: 97.1 F | RESPIRATION RATE: 16 BRPM | SYSTOLIC BLOOD PRESSURE: 142 MMHG | WEIGHT: 260 LBS | DIASTOLIC BLOOD PRESSURE: 90 MMHG | HEART RATE: 71 BPM | OXYGEN SATURATION: 99 % | HEIGHT: 66 IN

## 2019-08-12 PROCEDURE — 3610000056 HC PAIN LEVEL 4 BASE (NON-OR): Performed by: PHYSICAL MEDICINE & REHABILITATION

## 2019-08-12 PROCEDURE — 3209999900 FLUORO FOR SURGICAL PROCEDURES

## 2019-08-12 PROCEDURE — 99152 MOD SED SAME PHYS/QHP 5/>YRS: CPT | Performed by: PHYSICAL MEDICINE & REHABILITATION

## 2019-08-12 PROCEDURE — 6360000002 HC RX W HCPCS: Performed by: PHYSICAL MEDICINE & REHABILITATION

## 2019-08-12 PROCEDURE — 2500000003 HC RX 250 WO HCPCS: Performed by: PHYSICAL MEDICINE & REHABILITATION

## 2019-08-12 PROCEDURE — 2709999900 HC NON-CHARGEABLE SUPPLY: Performed by: PHYSICAL MEDICINE & REHABILITATION

## 2019-08-12 PROCEDURE — 3610000057 HC PAIN LEVEL 4 ADDL 15 MIN (NON-OR)

## 2019-08-12 RX ORDER — LIDOCAINE HYDROCHLORIDE 10 MG/ML
INJECTION, SOLUTION INFILTRATION; PERINEURAL
Status: COMPLETED | OUTPATIENT
Start: 2019-08-12 | End: 2019-08-12

## 2019-08-12 RX ORDER — TRAZODONE HYDROCHLORIDE 100 MG/1
100 TABLET ORAL NIGHTLY
COMMUNITY

## 2019-08-12 RX ORDER — OMEPRAZOLE 10 MG/1
10 CAPSULE, DELAYED RELEASE ORAL NIGHTLY
COMMUNITY

## 2019-08-12 RX ORDER — BUPIVACAINE HYDROCHLORIDE 5 MG/ML
INJECTION, SOLUTION PERINEURAL
Status: COMPLETED | OUTPATIENT
Start: 2019-08-12 | End: 2019-08-12

## 2019-08-12 RX ORDER — METHYLPREDNISOLONE ACETATE 80 MG/ML
INJECTION, SUSPENSION INTRA-ARTICULAR; INTRALESIONAL; INTRAMUSCULAR; SOFT TISSUE
Status: COMPLETED | OUTPATIENT
Start: 2019-08-12 | End: 2019-08-12

## 2019-08-12 RX ORDER — MIDAZOLAM HYDROCHLORIDE 1 MG/ML
INJECTION INTRAMUSCULAR; INTRAVENOUS
Status: COMPLETED | OUTPATIENT
Start: 2019-08-12 | End: 2019-08-12

## 2019-08-12 ASSESSMENT — PAIN SCALES - GENERAL
PAINLEVEL_OUTOF10: 0
PAINLEVEL_OUTOF10: 0

## 2019-08-12 ASSESSMENT — PAIN DESCRIPTION - DESCRIPTORS: DESCRIPTORS: NUMBNESS;BURNING

## 2019-08-12 ASSESSMENT — PAIN - FUNCTIONAL ASSESSMENT: PAIN_FUNCTIONAL_ASSESSMENT: 0-10

## 2019-08-12 NOTE — H&P
on file. Vitals: Blood pressure (!) 131/102, pulse 87, temperature 97.1 °F (36.2 °C), resp. rate 19, height 5' 6\" (1.676 m), weight 260 lb (117.9 kg), SpO2 97 %. PHYSICAL EXAM:including affected areas  HENT: Airway patent and reviewed  Cardiovascular: Normal rate, regular rhythm, normal heart sounds. Pulmonary/Chest: No wheezes. No rhonchi. No rales. Abdominal: Soft. Bowel sounds are normal. No distension. Extremities: Moves all extremities equally  Cervical and Lumbar Spine: Painful range of motion, no midline tenderness       Diagnosis:Lumbar radiculopathy  M51.26  M54.16   M47.816    Plan: Proceed with planned procedure      ASA CLASS:         []   I. Normal, healthy adult           []   II.  Mild systemic disease            [x]   III. Severe systemic disease      Mallampati: Mallampati Class II - (soft palate, fauces & uvula are visible)      Sedation plan:   [x]  Local              []  Minimal                  []  General anesthesia    Patient's condition acceptable for planned procedure/sedation. Post Procedure Plan   Return to same level of care   ______________________     The risks and benefits as well as alternatives to the procedure have been discussed with the patient and or family. The patient and or next of kin understands and agrees to proceed.     Brea Flannery M.D.

## 2019-08-15 ENCOUNTER — TELEPHONE (OUTPATIENT)
Dept: ORTHOPEDIC SURGERY | Age: 59
End: 2019-08-15

## 2019-08-15 NOTE — TELEPHONE ENCOUNTER
DOS   08/26/2019  CPT   77699  91324  94775.26    71658   NPR   OP SX AUTH  [id number]  VALID   08/26/2019 - 10/10/2019    LEFT  LEVELS   L4 - L5   PROCEDURE   TRANS FORAMINAL DEEPAK  DR. De Murphy  INSURANCE:   [facility]

## 2019-08-26 ENCOUNTER — HOSPITAL ENCOUNTER (OUTPATIENT)
Age: 59
Setting detail: OUTPATIENT SURGERY
Discharge: HOME | End: 2019-08-26
Attending: PHYSICAL MEDICINE & REHABILITATION | Admitting: PHYSICAL MEDICINE & REHABILITATION
Payer: MEDICARE

## 2019-08-26 ENCOUNTER — APPOINTMENT (OUTPATIENT)
Dept: GENERAL RADIOLOGY | Age: 59
End: 2019-08-26
Attending: PHYSICAL MEDICINE & REHABILITATION
Payer: MEDICARE

## 2019-08-26 VITALS
HEART RATE: 74 BPM | RESPIRATION RATE: 16 BRPM | DIASTOLIC BLOOD PRESSURE: 94 MMHG | TEMPERATURE: 98.2 F | SYSTOLIC BLOOD PRESSURE: 123 MMHG | OXYGEN SATURATION: 99 % | WEIGHT: 264 LBS | BODY MASS INDEX: 42.43 KG/M2 | HEIGHT: 66 IN

## 2019-08-26 PROCEDURE — 6360000002 HC RX W HCPCS: Performed by: PHYSICAL MEDICINE & REHABILITATION

## 2019-08-26 PROCEDURE — 2709999900 HC NON-CHARGEABLE SUPPLY: Performed by: PHYSICAL MEDICINE & REHABILITATION

## 2019-08-26 PROCEDURE — 99152 MOD SED SAME PHYS/QHP 5/>YRS: CPT | Performed by: PHYSICAL MEDICINE & REHABILITATION

## 2019-08-26 PROCEDURE — 2500000003 HC RX 250 WO HCPCS: Performed by: PHYSICAL MEDICINE & REHABILITATION

## 2019-08-26 PROCEDURE — 3209999900 FLUORO FOR SURGICAL PROCEDURES

## 2019-08-26 PROCEDURE — 3610000056 HC PAIN LEVEL 4 BASE (NON-OR): Performed by: PHYSICAL MEDICINE & REHABILITATION

## 2019-08-26 RX ORDER — BUPIVACAINE HYDROCHLORIDE 5 MG/ML
INJECTION, SOLUTION EPIDURAL; INTRACAUDAL
Status: COMPLETED | OUTPATIENT
Start: 2019-08-26 | End: 2019-08-26

## 2019-08-26 RX ORDER — MIDAZOLAM HYDROCHLORIDE 1 MG/ML
INJECTION INTRAMUSCULAR; INTRAVENOUS
Status: COMPLETED | OUTPATIENT
Start: 2019-08-26 | End: 2019-08-26

## 2019-08-26 RX ORDER — LIDOCAINE HYDROCHLORIDE 10 MG/ML
INJECTION, SOLUTION INFILTRATION; PERINEURAL
Status: COMPLETED | OUTPATIENT
Start: 2019-08-26 | End: 2019-08-26

## 2019-08-26 RX ORDER — METHYLPREDNISOLONE ACETATE 80 MG/ML
INJECTION, SUSPENSION INTRA-ARTICULAR; INTRALESIONAL; INTRAMUSCULAR; SOFT TISSUE
Status: COMPLETED | OUTPATIENT
Start: 2019-08-26 | End: 2019-08-26

## 2019-08-26 ASSESSMENT — PAIN SCALES - GENERAL
PAINLEVEL_OUTOF10: 0
PAINLEVEL_OUTOF10: 0

## 2019-08-26 ASSESSMENT — PAIN DESCRIPTION - DESCRIPTORS: DESCRIPTORS: SHOOTING

## 2019-08-26 ASSESSMENT — PAIN - FUNCTIONAL ASSESSMENT: PAIN_FUNCTIONAL_ASSESSMENT: 0-10

## 2019-08-26 NOTE — OP NOTE
Patient:  Leslie Garibay  YOB: 1960  Medical Record #:  [id number]   Place: [address] [place] [address]  Date:  8/26/2019   Physician:  Javier Lockwood MD, DEBBI    Procedure: 1. Transforaminal Lumbar Epidural Steroid Injection -  left L4  CPT 71083          2. Transforaminal Lumbar Epidural Steroid Injection -  left L5  CPT 91592    Pre-Procedure Diagnosis: Lumbar radiculopathy      Post-Procedure Diagnosis: Same    Sedation: Local with 1% Lidocaine 3 ml and 2 mg of IV Versed    EBL: None    Complications: None    Procedure Summary:        The patient was brought to the procedure suite and placed in the prone position. The skin overlying the lumbar spine was prepped and draped in the usual sterile fashion. Using fluoroscopic guidance, the left L4 foramen was identified. Through anesthetized skin, a 22 gauge 3.5 inch curved tip spinal needle was advanced into the foramen. Isovue M 300 was instilled showing an epidurogram/nerve root outline pattern without evidence of vascular or intrathecal spread. Following which, 50 mg of depomedrol mixed with 1 ml of 0.5% Marcaine was instilled. The needle was removed. Using fluoroscopic guidance, the left L5 foramen was identified. Through anesthetized skin, a 22 gauge 3.5 inch curved tip spinal needle was advanced into the foramen. Isovue M 300 was instilled showing an epidurogram/nerve root outline pattern without evidence of vascular or intrathecal spread. Following which, 50 mg of depomedrol mixed with 1 ml of 0.5% Marcaine was instilled. The needle was removed and band-aids were applied. The patient was transferred to the post-operative area in stable condition.

## 2019-09-18 ENCOUNTER — OFFICE VISIT (OUTPATIENT)
Dept: ORTHOPEDIC SURGERY | Age: 59
End: 2019-09-18
Payer: MEDICARE

## 2019-09-18 VITALS
HEIGHT: 66 IN | WEIGHT: 263.89 LBS | HEART RATE: 87 BPM | SYSTOLIC BLOOD PRESSURE: 111 MMHG | BODY MASS INDEX: 42.41 KG/M2 | DIASTOLIC BLOOD PRESSURE: 80 MMHG

## 2019-09-18 DIAGNOSIS — M47.816 SPONDYLOSIS WITHOUT MYELOPATHY OR RADICULOPATHY, LUMBAR REGION: ICD-10-CM

## 2019-09-18 DIAGNOSIS — R20.2 PARESTHESIA OF LEFT LOWER EXTREMITY: ICD-10-CM

## 2019-09-18 DIAGNOSIS — M51.36 DEGENERATIVE LUMBAR DISC: ICD-10-CM

## 2019-09-18 DIAGNOSIS — M54.16 LUMBAR RADICULOPATHY: ICD-10-CM

## 2019-09-18 DIAGNOSIS — M51.26 HNP (HERNIATED NUCLEUS PULPOSUS), LUMBAR: Primary | ICD-10-CM

## 2019-09-18 PROCEDURE — 4004F PT TOBACCO SCREEN RCVD TLK: CPT | Performed by: PHYSICIAN ASSISTANT

## 2019-09-18 PROCEDURE — G8417 CALC BMI ABV UP PARAM F/U: HCPCS | Performed by: PHYSICIAN ASSISTANT

## 2019-09-18 PROCEDURE — 99213 OFFICE O/P EST LOW 20 MIN: CPT | Performed by: PHYSICIAN ASSISTANT

## 2019-09-18 PROCEDURE — G8427 DOCREV CUR MEDS BY ELIG CLIN: HCPCS | Performed by: PHYSICIAN ASSISTANT

## 2019-09-18 PROCEDURE — 3017F COLORECTAL CA SCREEN DOC REV: CPT | Performed by: PHYSICIAN ASSISTANT

## 2019-09-18 RX ORDER — MELOXICAM 15 MG/1
15 TABLET ORAL DAILY
Qty: 30 TABLET | Refills: 0 | Status: SHIPPED | OUTPATIENT
Start: 2019-09-18 | End: 2020-01-10 | Stop reason: ALTCHOICE

## 2019-09-18 RX ORDER — TRAMADOL HYDROCHLORIDE 50 MG/1
50 TABLET ORAL 3 TIMES DAILY
Qty: 21 TABLET | Refills: 0 | Status: SHIPPED | OUTPATIENT
Start: 2019-09-18 | End: 2019-09-25

## 2019-09-18 NOTE — PROGRESS NOTES
Follow up: Dejuan Peter  1960  [id number]      CHIEF COMPLAINT:    Chief Complaint   Patient presents with    Lower Back Pain     F/u Left L4 & Left L5 TF 8/12 & 8/26         HISTORY OF PRESENT ILLNESS:  Mr. Heather Paniagua is a 63 y.o. male returns for a follow up visit for multiple medical problems. His current presenting problems are   1. HNP (herniated nucleus pulposus), lumbar    2. Lumbar radiculopathy    3. Spondylosis without myelopathy or radiculopathy, lumbar region    4. Degenerative lumbar disc    5. Paresthesia of left lower extremity    . As per information/history obtained from the PADT(patient assessment and documentation tool) - He complains of pain in the lower back with radiation to the upper leg Bilateral and lower leg Bilateral He rates the pain 5/10 and describes it as sharp, burning, piercing. Pain is made worse by: movement. He denies side effects from the current pain regimen. Patient reports that since the last follow-up visit the physical functioning is worse, family/social relationships are worse, mood is worse and sleep patterns are worse, and that overall functioning is worse. Patient denies neurological bowel or bladder. Patient presents today for follow up of low back and bilateral leg pain, left greater than right. He recently underwent a left L4 and left L5 transforaminal epidural steroid injection on 8/12/19 and 8/26/19. He notes overall 50% improvement. He states pain level prior to procedures was 9/10 and today he notes pain level to be 5/10. He states pain continues to be exasperated by general day to day activities and movement. He notes no side effects to procedures. He also notes no new injuries since procedures. The patient has continued to have sharp shooting and burning pain down the left leg. The pain will start from the left hip. He describes that he does have some weakness. He does use a cane to ambulate.   He describes that although his lower back congenitally narrowed   spinal canal.   3. 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 Results for orders placed or performed during the hospital encounter of 19   Quantiferon, Incubated   Result Value Ref Range    Quantiferon TB Minus NIL Negative Negative    Quantiferon TB1 Minus NIL 0.03 0.00 - 0.34 IU/mL    Quantiferon TB2 Minus NIL 0.03 0.00 - 0.34 IU/mL    QuantiFERON Mitogen >10.00 IU/mL    QuantiFERON Nil 0.03 IU/mL     Impression:       1. HNP (herniated nucleus pulposus), lumbar    2. Lumbar radiculopathy    3. Spondylosis without myelopathy or radiculopathy, lumbar region    4. Degenerative lumbar disc    5. Paresthesia of left lower extremity        Plan:  Clinical Course: Above diagnoses are worsening    I discussed the diagnosis and the treatment options with Arnaud Drake today. In Summary:  The various treatment options were outlined and discussed with Arnaud Drake including:  Conservative care options: physical therapy, ice, medications, bracing, and activity modification. The indications for therapeutic injections. The indications for additional imaging/laboratory studies. The indications for (possible future) interventions. After considering the various options discussed, Arnaud Drake elected to pursue a course of treatment that includes the followin. Medications:  I will prescribe Meloxicam 15 mg po qd to help with inflammation and reduce pain. CV, GI and Nephro risks were reviewed. 2. PT:  Encouraged to continue with HEP. 3. Further studies:  Obtain EMG to evaluate for paresthesias to rule out a nerve entrapment. LLE. 4. Interventional:  After further imaging is obtained, interventional options will be reviewed and recommended. 5. Follow up:  1-2 weeks      Arnaud Drake was instructed to call the office if his symptoms worsen or if new symptoms appear prior to the next scheduled visit.  He is specifically instructed to contact the office between now & his

## 2019-10-10 ENCOUNTER — OFFICE VISIT (OUTPATIENT)
Dept: ORTHOPEDIC SURGERY | Age: 59
End: 2019-10-10
Payer: MEDICARE

## 2019-10-10 DIAGNOSIS — M79.605 PAIN OF LEFT LOWER EXTREMITY: Primary | ICD-10-CM

## 2019-10-10 PROCEDURE — 95886 MUSC TEST DONE W/N TEST COMP: CPT | Performed by: PHYSICAL MEDICINE & REHABILITATION

## 2019-10-10 PROCEDURE — 95908 NRV CNDJ TST 3-4 STUDIES: CPT | Performed by: PHYSICAL MEDICINE & REHABILITATION

## 2019-10-17 ENCOUNTER — OFFICE VISIT (OUTPATIENT)
Dept: ORTHOPEDIC SURGERY | Age: 59
End: 2019-10-17
Payer: MEDICARE

## 2019-10-17 VITALS — BODY MASS INDEX: 42.41 KG/M2 | WEIGHT: 263.89 LBS | HEIGHT: 66 IN

## 2019-10-17 DIAGNOSIS — M51.36 DDD (DEGENERATIVE DISC DISEASE), LUMBAR: ICD-10-CM

## 2019-10-17 DIAGNOSIS — M48.062 LUMBAR STENOSIS WITH NEUROGENIC CLAUDICATION: Primary | ICD-10-CM

## 2019-10-17 DIAGNOSIS — M47.816 SPONDYLOSIS OF LUMBAR REGION WITHOUT MYELOPATHY OR RADICULOPATHY: ICD-10-CM

## 2019-10-17 PROCEDURE — G8417 CALC BMI ABV UP PARAM F/U: HCPCS | Performed by: PHYSICAL MEDICINE & REHABILITATION

## 2019-10-17 PROCEDURE — G8484 FLU IMMUNIZE NO ADMIN: HCPCS | Performed by: PHYSICAL MEDICINE & REHABILITATION

## 2019-10-17 PROCEDURE — 3017F COLORECTAL CA SCREEN DOC REV: CPT | Performed by: PHYSICAL MEDICINE & REHABILITATION

## 2019-10-17 PROCEDURE — 99214 OFFICE O/P EST MOD 30 MIN: CPT | Performed by: PHYSICAL MEDICINE & REHABILITATION

## 2019-10-17 PROCEDURE — 4004F PT TOBACCO SCREEN RCVD TLK: CPT | Performed by: PHYSICAL MEDICINE & REHABILITATION

## 2019-10-17 PROCEDURE — G8427 DOCREV CUR MEDS BY ELIG CLIN: HCPCS | Performed by: PHYSICAL MEDICINE & REHABILITATION

## 2019-10-17 RX ORDER — MELOXICAM 15 MG/1
15 TABLET ORAL DAILY
Qty: 30 TABLET | Refills: 1 | Status: SHIPPED | OUTPATIENT
Start: 2019-10-17 | End: 2019-12-05

## 2019-12-05 ENCOUNTER — OFFICE VISIT (OUTPATIENT)
Dept: ORTHOPEDIC SURGERY | Age: 59
End: 2019-12-05
Payer: MEDICARE

## 2019-12-05 VITALS
HEIGHT: 66 IN | BODY MASS INDEX: 41.95 KG/M2 | DIASTOLIC BLOOD PRESSURE: 85 MMHG | HEART RATE: 87 BPM | WEIGHT: 261 LBS | SYSTOLIC BLOOD PRESSURE: 139 MMHG

## 2019-12-05 DIAGNOSIS — M75.81 RIGHT ROTATOR CUFF TENDINITIS: ICD-10-CM

## 2019-12-05 DIAGNOSIS — M75.82 ROTATOR CUFF TENDINITIS, LEFT: ICD-10-CM

## 2019-12-05 DIAGNOSIS — M25.512 BILATERAL SHOULDER PAIN, UNSPECIFIED CHRONICITY: Primary | ICD-10-CM

## 2019-12-05 DIAGNOSIS — M25.511 BILATERAL SHOULDER PAIN, UNSPECIFIED CHRONICITY: Primary | ICD-10-CM

## 2019-12-05 PROCEDURE — G8427 DOCREV CUR MEDS BY ELIG CLIN: HCPCS | Performed by: ORTHOPAEDIC SURGERY

## 2019-12-05 PROCEDURE — G8484 FLU IMMUNIZE NO ADMIN: HCPCS | Performed by: ORTHOPAEDIC SURGERY

## 2019-12-05 PROCEDURE — 3017F COLORECTAL CA SCREEN DOC REV: CPT | Performed by: ORTHOPAEDIC SURGERY

## 2019-12-05 PROCEDURE — 4004F PT TOBACCO SCREEN RCVD TLK: CPT | Performed by: ORTHOPAEDIC SURGERY

## 2019-12-05 PROCEDURE — 99213 OFFICE O/P EST LOW 20 MIN: CPT | Performed by: ORTHOPAEDIC SURGERY

## 2019-12-05 PROCEDURE — G8417 CALC BMI ABV UP PARAM F/U: HCPCS | Performed by: ORTHOPAEDIC SURGERY

## 2019-12-05 PROCEDURE — 20610 DRAIN/INJ JOINT/BURSA W/O US: CPT | Performed by: ORTHOPAEDIC SURGERY

## 2019-12-11 RX ORDER — MELOXICAM 15 MG/1
15 TABLET ORAL DAILY
Qty: 30 TABLET | Refills: 0 | Status: SHIPPED | OUTPATIENT
Start: 2019-12-11 | End: 2020-01-10

## 2019-12-19 ENCOUNTER — OFFICE VISIT (OUTPATIENT)
Dept: ORTHOPEDIC SURGERY | Age: 59
End: 2019-12-19
Payer: MEDICARE

## 2019-12-19 VITALS
DIASTOLIC BLOOD PRESSURE: 87 MMHG | HEIGHT: 66 IN | BODY MASS INDEX: 41.95 KG/M2 | HEART RATE: 79 BPM | SYSTOLIC BLOOD PRESSURE: 126 MMHG | WEIGHT: 261.02 LBS

## 2019-12-19 DIAGNOSIS — M51.26 HNP (HERNIATED NUCLEUS PULPOSUS), LUMBAR: Primary | ICD-10-CM

## 2019-12-19 DIAGNOSIS — M54.16 LUMBAR RADICULOPATHY: ICD-10-CM

## 2019-12-19 DIAGNOSIS — M48.062 LUMBAR STENOSIS WITH NEUROGENIC CLAUDICATION: ICD-10-CM

## 2019-12-19 PROCEDURE — G8427 DOCREV CUR MEDS BY ELIG CLIN: HCPCS | Performed by: PHYSICAL MEDICINE & REHABILITATION

## 2019-12-19 PROCEDURE — G8484 FLU IMMUNIZE NO ADMIN: HCPCS | Performed by: PHYSICAL MEDICINE & REHABILITATION

## 2019-12-19 PROCEDURE — 99214 OFFICE O/P EST MOD 30 MIN: CPT | Performed by: PHYSICAL MEDICINE & REHABILITATION

## 2019-12-19 PROCEDURE — G8417 CALC BMI ABV UP PARAM F/U: HCPCS | Performed by: PHYSICAL MEDICINE & REHABILITATION

## 2019-12-19 PROCEDURE — 4004F PT TOBACCO SCREEN RCVD TLK: CPT | Performed by: PHYSICAL MEDICINE & REHABILITATION

## 2019-12-19 PROCEDURE — 3017F COLORECTAL CA SCREEN DOC REV: CPT | Performed by: PHYSICAL MEDICINE & REHABILITATION

## 2019-12-23 ENCOUNTER — TELEPHONE (OUTPATIENT)
Dept: ORTHOPEDIC SURGERY | Age: 59
End: 2019-12-23

## 2019-12-24 ENCOUNTER — TELEPHONE (OUTPATIENT)
Dept: ORTHOPEDIC SURGERY | Age: 59
End: 2019-12-24

## 2019-12-31 ENCOUNTER — TELEPHONE (OUTPATIENT)
Dept: ORTHOPEDIC SURGERY | Age: 59
End: 2019-12-31

## 2020-01-10 ENCOUNTER — TELEPHONE (OUTPATIENT)
Dept: ORTHOPEDIC SURGERY | Age: 60
End: 2020-01-10

## 2020-01-10 RX ORDER — MELOXICAM 15 MG/1
15 TABLET ORAL DAILY
Qty: 30 TABLET | Refills: 0 | Status: SHIPPED | OUTPATIENT
Start: 2020-01-10 | End: 2020-02-10

## 2020-01-10 NOTE — TELEPHONE ENCOUNTER
DOS   01/20/2020  CPT   08077  79768  DX   M51.26   M54.16   M48.062  OP SX AUTH  [id number]  VALID   01/20/2020 - 03/05/2020    LEFT  LEVELS   L4  L5   PROCEDURE   TRANS FORAMINAL DEEPAK    [address] [place] [address],[address]:   [address]

## 2020-01-20 ENCOUNTER — TELEPHONE (OUTPATIENT)
Dept: ORTHOPEDIC SURGERY | Age: 60
End: 2020-01-20

## 2020-02-10 ENCOUNTER — TELEPHONE (OUTPATIENT)
Dept: ORTHOPEDIC SURGERY | Age: 60
End: 2020-02-10

## 2020-02-10 RX ORDER — MELOXICAM 15 MG/1
15 TABLET ORAL DAILY
Qty: 30 TABLET | Refills: 0 | Status: SHIPPED | OUTPATIENT
Start: 2020-02-10 | End: 2020-03-10

## 2020-02-12 ENCOUNTER — HOSPITAL ENCOUNTER (OUTPATIENT)
Age: 60
Setting detail: OUTPATIENT SURGERY
Discharge: HOME | End: 2020-02-12
Attending: PHYSICAL MEDICINE & REHABILITATION | Admitting: PHYSICAL MEDICINE & REHABILITATION
Payer: MEDICARE

## 2020-02-12 ENCOUNTER — APPOINTMENT (OUTPATIENT)
Dept: GENERAL RADIOLOGY | Age: 60
End: 2020-02-12
Attending: PHYSICAL MEDICINE & REHABILITATION
Payer: MEDICARE

## 2020-02-12 VITALS
TEMPERATURE: 97.4 F | HEIGHT: 66 IN | RESPIRATION RATE: 16 BRPM | OXYGEN SATURATION: 100 % | HEART RATE: 78 BPM | WEIGHT: 261 LBS | SYSTOLIC BLOOD PRESSURE: 123 MMHG | BODY MASS INDEX: 41.95 KG/M2 | DIASTOLIC BLOOD PRESSURE: 97 MMHG

## 2020-02-12 PROCEDURE — 2500000003 HC RX 250 WO HCPCS: Performed by: PHYSICAL MEDICINE & REHABILITATION

## 2020-02-12 PROCEDURE — 2709999900 HC NON-CHARGEABLE SUPPLY: Performed by: PHYSICAL MEDICINE & REHABILITATION

## 2020-02-12 PROCEDURE — 99152 MOD SED SAME PHYS/QHP 5/>YRS: CPT | Performed by: PHYSICAL MEDICINE & REHABILITATION

## 2020-02-12 PROCEDURE — 3610000056 HC PAIN LEVEL 4 BASE (NON-OR): Performed by: PHYSICAL MEDICINE & REHABILITATION

## 2020-02-12 PROCEDURE — 3209999900 FLUORO FOR SURGICAL PROCEDURES

## 2020-02-12 PROCEDURE — 6360000002 HC RX W HCPCS: Performed by: PHYSICAL MEDICINE & REHABILITATION

## 2020-02-12 RX ORDER — VARENICLINE TARTRATE
KIT
COMMUNITY
Start: 2020-01-10

## 2020-02-12 RX ORDER — BUPIVACAINE HYDROCHLORIDE 5 MG/ML
INJECTION, SOLUTION EPIDURAL; INTRACAUDAL
Status: COMPLETED | OUTPATIENT
Start: 2020-02-12 | End: 2020-02-12

## 2020-02-12 RX ORDER — LIDOCAINE HYDROCHLORIDE 10 MG/ML
INJECTION, SOLUTION INFILTRATION; PERINEURAL
Status: COMPLETED | OUTPATIENT
Start: 2020-02-12 | End: 2020-02-12

## 2020-02-12 RX ORDER — MIDAZOLAM HYDROCHLORIDE 1 MG/ML
INJECTION INTRAMUSCULAR; INTRAVENOUS
Status: COMPLETED | OUTPATIENT
Start: 2020-02-12 | End: 2020-02-12

## 2020-02-12 RX ORDER — BETAMETHASONE SODIUM PHOSPHATE AND BETAMETHASONE ACETATE 3; 3 MG/ML; MG/ML
INJECTION, SUSPENSION INTRA-ARTICULAR; INTRALESIONAL; INTRAMUSCULAR; SOFT TISSUE
Status: COMPLETED | OUTPATIENT
Start: 2020-02-12 | End: 2020-02-12

## 2020-02-12 ASSESSMENT — PAIN DESCRIPTION - ORIENTATION
ORIENTATION: LOWER
ORIENTATION: LOWER

## 2020-02-12 ASSESSMENT — PAIN DESCRIPTION - DIRECTION
RADIATING_TOWARDS: LEFT LEG
RADIATING_TOWARDS: LEFT LEG

## 2020-02-12 ASSESSMENT — PAIN - FUNCTIONAL ASSESSMENT: PAIN_FUNCTIONAL_ASSESSMENT: 0-10

## 2020-02-12 ASSESSMENT — PAIN DESCRIPTION - FREQUENCY
FREQUENCY: CONTINUOUS
FREQUENCY: CONTINUOUS

## 2020-02-12 ASSESSMENT — PAIN DESCRIPTION - PAIN TYPE
TYPE: CHRONIC PAIN
TYPE: CHRONIC PAIN

## 2020-02-12 ASSESSMENT — PAIN SCALES - GENERAL
PAINLEVEL_OUTOF10: 0
PAINLEVEL_OUTOF10: 4
PAINLEVEL_OUTOF10: 4

## 2020-02-12 ASSESSMENT — PAIN DESCRIPTION - LOCATION
LOCATION: BACK
LOCATION: BACK

## 2020-02-12 NOTE — PROGRESS NOTES
IV discontinued, catheter intact, and dressing applied. Procedural dressing dry and intact. Bilateral lower extremities equal in strength. Discharge instructions reviewed with patient or responsible adult, signed and copy given. All home medications have been reviewed. All questions answered and patient or responsible adult verbalized understanding.   PAIN LEVEL AT DISCHARGE __4___

## 2020-03-04 ENCOUNTER — OFFICE VISIT (OUTPATIENT)
Dept: ORTHOPEDIC SURGERY | Age: 60
End: 2020-03-04
Payer: MEDICARE

## 2020-03-04 VITALS
HEIGHT: 66 IN | BODY MASS INDEX: 41.95 KG/M2 | DIASTOLIC BLOOD PRESSURE: 88 MMHG | HEART RATE: 83 BPM | SYSTOLIC BLOOD PRESSURE: 130 MMHG | WEIGHT: 261.02 LBS

## 2020-03-04 PROCEDURE — G8417 CALC BMI ABV UP PARAM F/U: HCPCS | Performed by: PHYSICAL MEDICINE & REHABILITATION

## 2020-03-04 PROCEDURE — 4004F PT TOBACCO SCREEN RCVD TLK: CPT | Performed by: PHYSICAL MEDICINE & REHABILITATION

## 2020-03-04 PROCEDURE — 3017F COLORECTAL CA SCREEN DOC REV: CPT | Performed by: PHYSICAL MEDICINE & REHABILITATION

## 2020-03-04 PROCEDURE — 99213 OFFICE O/P EST LOW 20 MIN: CPT | Performed by: PHYSICAL MEDICINE & REHABILITATION

## 2020-03-04 PROCEDURE — G8484 FLU IMMUNIZE NO ADMIN: HCPCS | Performed by: PHYSICAL MEDICINE & REHABILITATION

## 2020-03-04 PROCEDURE — G8427 DOCREV CUR MEDS BY ELIG CLIN: HCPCS | Performed by: PHYSICAL MEDICINE & REHABILITATION

## 2020-03-04 NOTE — PROGRESS NOTES
Medications:     Current Outpatient Medications:     CHANTIX STARTING MONTH MATT 0.5 MG X 11 & 1 MG X 42 tablet, TK 1 T PO QD UTD, Disp: , Rfl:     meloxicam (MOBIC) 15 MG tablet, TAKE 1 TABLET BY MOUTH DAILY, Disp: 30 tablet, Rfl: 0    omeprazole (PRILOSEC) 10 MG delayed release capsule, Take 10 mg by mouth daily, Disp: , Rfl:     traZODone (DESYREL) 100 MG tablet, Take 100 mg by mouth nightly, Disp: , Rfl:     ibuprofen (ADVIL;MOTRIN) 600 MG tablet, TK 1 T PO QD, Disp: , Rfl: 0    Ascorbic Acid Buffered (BUFFERED C POWDER PO), Take by mouth, Disp: , Rfl:     amLODIPine (NORVASC) 10 MG tablet, TK 1 T PO QD, Disp: , Rfl: 4    losartan-hydrochlorothiazide (HYZAAR) 100-25 MG per tablet, TK 1 T PO QD, Disp: , Rfl: 4    diclofenac sodium 1 % GEL, MATT 1 GRAM BID, Disp: , Rfl: 2    traMADol (ULTRAM) 50 MG tablet, Take 50 mg by mouth every 6 hours as needed for Pain, Disp: , Rfl:     vitamin D (CHOLECALCIFEROL) 1000 UNIT TABS tablet, Take 1,000 Units by mouth daily, Disp: , Rfl:   Allergies:  Patient has no known allergies. Social History:    reports that he has been smoking cigarettes. He started smoking about 43 years ago. He has a 41.00 pack-year smoking history. He has never used smokeless tobacco. He reports that he does not use drugs. Family History:   Family history reviewed with the patient. No pertinent family history to the chief complaint    REVIEW OF SYSTEMS:   CONSTITUTIONAL: Denies unexplained weight loss, fevers, chills or fatigue  NEUROLOGICAL: Denies unsteady gait or progressive weakness  MUSCULOSKELETAL: Denies joint swelling or redness  GI: Denies nausea, vomiting, diarrhea   : Denies bowel or bladder issues       PHYSICAL EXAM:    Vitals: Blood pressure 130/88, pulse 83, height 5' 5.98\" (1.676 m), weight 261 lb 0.4 oz (118.4 kg). GENERAL EXAM:  · General Apparence: Patient is adequately groomed with no evidence of malnutrition.   · Psychiatric: Orientation: The patient is oriented to ataxia  · Additional Examinations:  · RIGHT LOWER EXTREMITY: Inspection/examination of the right lower extremity does not show any tenderness, deformity or injury. Range of motion is normal and pain-free. There is no gross instability. There are no rashes, ulcerations or lesions. Strength and tone are normal. No atrophy or abnormal movements are noted. · LEFT LOWER EXTREMITY:  Inspection/examination of the left lower extremity does not show any tenderness, deformity or injury. Range of motion is normal and pain-free. There is no gross instability. There are no rashes, ulcerations or lesions. Strength and tone are normal. No atrophy or abnormal movements are noted. Diagnostic Testing:    MR Lumbar spine shows    1. Moderate spinal canal stenosis, moderate left and mild right neural   foraminal narrowing at L4-5 secondary to a disc bulge, facet arthropathy and   thickening of the ligamentum flavum superimposed upon a congenitally narrowed   spinal canal.   2. Moderate spinal canal stenosis, moderate left and mild right neural   foraminal narrowing at L3-4 secondary to a disc bulge, facet arthropathy and   thickening of the ligamentum flavum superimposed upon a congenitally narrowed   spinal canal.   3. 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 Results for orders placed or performed during the hospital encounter of 05/01/19   Quantiferon, Incubated   Result Value Ref Range    Quantiferon TB Minus NIL Negative Negative    Quantiferon TB1 Minus NIL 0.03 0.00 - 0.34 IU/mL    Quantiferon TB2 Minus NIL 0.03 0.00 - 0.34 IU/mL    QuantiFERON Mitogen >10.00 IU/mL    QuantiFERON Nil 0.03 IU/mL     Impression:       1. HNP (herniated nucleus pulposus), lumbar    2. Lumbar radiculopathy    3. Lumbar stenosis with neurogenic claudication        Plan:  Clinical Course: Above diagnoses are improving     I discussed the diagnosis and the treatment options with Emperatriz Macdonald today.      In Summary:  The various treatment options were outlined and discussed with Antonio Diane including:  Conservative care options: physical therapy, ice, medications, bracing, and activity modification. The indications for therapeutic injections. The indications for additional imaging/laboratory studies. The indications for (possible future) interventions. After considering the various options discussed, Antonio Diane elected to pursue a course of treatment that includes the followin. Medications:  No further recommendations for new medications. 2. PT:  Prescribed home exercise program.    3. Further studies: No further studies. 4. Interventional:  60% relief after LESI    5. Follow up:  4-6 weeks      Antonio Diane was instructed to call the office if his symptoms worsen or if new symptoms appear prior to the next scheduled visit. He is specifically instructed to contact the office between now & his scheduled appointment if he has concerns related to his condition or if he needs assistance in scheduling the above tests. He is welcome to call for an appointment sooner if he has any additional concerns or questions. Everett Floyd. Tiago Monsivais MD, DEBBI, [facility]  Board Certified in [address] [place] [address]  Certified and Fellowship Trained in [facility] ([facility])             This dictation was performed with a verbal recognition program [facility]) and it was checked for errors. It is possible that there are still dictated errors within this office note. If so, please bring any errors to my attention for an addendum. All efforts were made to ensure that this office note is accurate.

## 2020-03-10 RX ORDER — MELOXICAM 15 MG/1
15 TABLET ORAL DAILY
Qty: 30 TABLET | Refills: 0 | Status: SHIPPED | OUTPATIENT
Start: 2020-03-10 | End: 2020-05-11 | Stop reason: ALTCHOICE

## 2020-04-02 ENCOUNTER — TELEPHONE (OUTPATIENT)
Dept: ORTHOPEDIC SURGERY | Age: 60
End: 2020-04-02

## 2020-04-02 ENCOUNTER — OFFICE VISIT (OUTPATIENT)
Dept: ORTHOPEDIC SURGERY | Age: 60
End: 2020-04-02
Payer: MEDICARE

## 2020-04-02 VITALS
DIASTOLIC BLOOD PRESSURE: 86 MMHG | HEART RATE: 80 BPM | HEIGHT: 66 IN | SYSTOLIC BLOOD PRESSURE: 132 MMHG | BODY MASS INDEX: 41.95 KG/M2 | WEIGHT: 261.02 LBS

## 2020-04-02 PROCEDURE — G8417 CALC BMI ABV UP PARAM F/U: HCPCS | Performed by: PHYSICAL MEDICINE & REHABILITATION

## 2020-04-02 PROCEDURE — 3017F COLORECTAL CA SCREEN DOC REV: CPT | Performed by: PHYSICAL MEDICINE & REHABILITATION

## 2020-04-02 PROCEDURE — G8427 DOCREV CUR MEDS BY ELIG CLIN: HCPCS | Performed by: PHYSICAL MEDICINE & REHABILITATION

## 2020-04-02 PROCEDURE — 99214 OFFICE O/P EST MOD 30 MIN: CPT | Performed by: PHYSICAL MEDICINE & REHABILITATION

## 2020-04-02 PROCEDURE — 4004F PT TOBACCO SCREEN RCVD TLK: CPT | Performed by: PHYSICAL MEDICINE & REHABILITATION

## 2020-04-02 RX ORDER — MELOXICAM 15 MG/1
15 TABLET ORAL DAILY
Qty: 30 TABLET | Refills: 0 | Status: SHIPPED | OUTPATIENT
Start: 2020-04-02 | End: 2020-05-11

## 2020-04-02 RX ORDER — METHYLPREDNISOLONE 4 MG/1
TABLET ORAL
Qty: 1 KIT | Refills: 0 | Status: SHIPPED | OUTPATIENT
Start: 2020-04-02 | End: 2020-09-23

## 2020-04-03 ENCOUNTER — TELEPHONE (OUTPATIENT)
Dept: ORTHOPEDIC SURGERY | Age: 60
End: 2020-04-03

## 2020-04-08 ENCOUNTER — TELEPHONE (OUTPATIENT)
Dept: ORTHOPEDIC SURGERY | Age: 60
End: 2020-04-08

## 2020-05-07 ENCOUNTER — TELEPHONE (OUTPATIENT)
Dept: ORTHOPEDIC SURGERY | Age: 60
End: 2020-05-07

## 2020-05-11 RX ORDER — MELOXICAM 15 MG/1
15 TABLET ORAL DAILY
Qty: 30 TABLET | Refills: 0 | Status: SHIPPED | OUTPATIENT
Start: 2020-05-11 | End: 2020-06-08

## 2020-06-08 RX ORDER — MELOXICAM 15 MG/1
15 TABLET ORAL DAILY
Qty: 30 TABLET | Refills: 0 | Status: SHIPPED | OUTPATIENT
Start: 2020-06-08 | End: 2020-06-09

## 2020-06-08 NOTE — TELEPHONE ENCOUNTER
Patient last seen 20 and medication last filled 20:    Impression:         1. HNP (herniated nucleus pulposus), lumbar    2. Lumbar radiculopathy    3. Lumbar stenosis with neurogenic claudication          Plan:  Clinical Course: Above diagnoses are worsening     I discussed the diagnosis and the treatment options with Jermaine Garciaox today.      In Summary:  The various treatment options were outlined and discussed with Jermaine Garciaox including:  Conservative care options: physical therapy, ice, medications, bracing, and activity modification. The indications for therapeutic injections. The indications for additional imaging/laboratory studies.  The indications for (possible future) interventions.      After considering the various options discussed, Jermaine Rasmussen elected to pursue a course of treatment that includes the followin. Medications:  Recommend to try 1/2 the dose of trazadone  Refill meloxicam 15 mg po daily. Risks, benefits and alternatives were discussed. I will prescribe a medrol dose pack to help with the inflammatory component of pain.  Risks, benefits and alternatives were discussed. Recommended to stop any NSAIDs to reduce risk of GI bleed during the course of the dose pack.       2. PT:  Encouraged to continue with Home exercise program.     3. Further studies: No further studies.        4. Interventional:  We discussed pursuing a Left L4 and L5 TF epidural steroid injection to address the pain.  Radiologic imaging and symptoms confirm the pain etiology.  Risks, benefits and alternatives of interventional options were discussed.  These include and are not limited to bleeding, infection, spinal headache, nerve injury and lack of pain relief.  The patient verbalized understanding and would like to proceed.  The patient will be scheduled accordingly.     5.  Follow up:  2-3 weeks

## 2020-06-09 RX ORDER — MELOXICAM 15 MG/1
15 TABLET ORAL DAILY
Qty: 90 TABLET | Refills: 0 | Status: SHIPPED | OUTPATIENT
Start: 2020-06-09 | End: 2020-09-08

## 2020-08-03 ENCOUNTER — OFFICE VISIT (OUTPATIENT)
Dept: ORTHOPEDIC SURGERY | Age: 60
End: 2020-08-03
Payer: MEDICARE

## 2020-08-03 VITALS — BODY MASS INDEX: 41.95 KG/M2 | TEMPERATURE: 98.6 F | HEIGHT: 66 IN | WEIGHT: 261 LBS

## 2020-08-03 PROCEDURE — 3017F COLORECTAL CA SCREEN DOC REV: CPT | Performed by: ORTHOPAEDIC SURGERY

## 2020-08-03 PROCEDURE — G8417 CALC BMI ABV UP PARAM F/U: HCPCS | Performed by: ORTHOPAEDIC SURGERY

## 2020-08-03 PROCEDURE — 99213 OFFICE O/P EST LOW 20 MIN: CPT | Performed by: ORTHOPAEDIC SURGERY

## 2020-08-03 PROCEDURE — 4004F PT TOBACCO SCREEN RCVD TLK: CPT | Performed by: ORTHOPAEDIC SURGERY

## 2020-08-03 PROCEDURE — G8427 DOCREV CUR MEDS BY ELIG CLIN: HCPCS | Performed by: ORTHOPAEDIC SURGERY

## 2020-08-03 NOTE — PROGRESS NOTES
[facility]  Office Visit  Follow up  Date:  8/3/2020    Name:  Ayaan Nieves  Address:  [address] [place] [address]    :  1960      Age:   62 y.o.    SSN:  [id number]      Medical Record Number:  [id number]    Chief Complaint:    Follow up for bilateral shoulder pain right greater than left    HPI:   Ayaan Nieves is a 62 y.o. male who is following up regarding his bilateral shoulder pain. He states he had bilateral subacromial and AC joint injections this past December. He states he had roughly 50% pain relief for approximately 2 months before his pain returned to the preinjection level. Since then has been using tramadol, meloxicam, topical Voltaren gel with only minimal relief. His pain bothers him greatly at night does have difficulty sleeping. He is currently on disability. He denies any new numbness, tingling, fevers, chills, chest pain, shortness of breath, or any other new significant symptoms.          Past History:  Past Medical History:   Diagnosis Date    Acid reflux     Hypertension        Past Surgical History:   Procedure Laterality Date    LUMBAR SPINE SURGERY Left 2019    LEFT L4 AND L5 TRANSFORAMINAL EPIDURAL STEROID INJECTION WITH FLUOROSCOPY performed by Maximilian Escobar MD at [address] [place] [address] Left 2019    LEFT L4 AND L5 TRANSFORAMINAL EPIDURAL STEROID INJECTION WITH FLUOROSCOPY performed by Maximilian Escobar MD at [address] [place] [address] Left 2020    LEFT L4 AND L5 TRANSFORAMINAL EPDIURAL STEROID INJECTION WITH FLUOROSCOPY (05394, 61475) performed by Maximilian Escobar MD at [place] [address] History     Tobacco Use    Smoking status: Current Every Day Smoker     Packs/day: 1.00     Years: 41.00     Pack years: 41.00     Types: Cigarettes     Start date: 0    Smokeless tobacco: Never Used   Substance Use Topics    Alcohol use: Not on file     Comment: occ    Drug use: Never        Family History:  family history is not on file. Current Outpatient Medications:     meloxicam (MOBIC) 15 MG tablet, TAKE 1 TABLET BY MOUTH DAILY, Disp: 90 tablet, Rfl: 0    methylPREDNISolone (MEDROL, MATT,) 4 MG tablet, Take by mouth as directed., Disp: 1 kit, Rfl: 0    CHANTIX STARTING MONTH MATT 0.5 MG X 11 & 1 MG X 42 tablet, TK 1 T PO QD UTD, Disp: , Rfl:     omeprazole (PRILOSEC) 10 MG delayed release capsule, Take 10 mg by mouth daily, Disp: , Rfl:     traZODone (DESYREL) 100 MG tablet, Take 100 mg by mouth nightly, Disp: , Rfl:     Ascorbic Acid Buffered (BUFFERED C POWDER PO), Take by mouth, Disp: , Rfl:     amLODIPine (NORVASC) 10 MG tablet, TK 1 T PO QD, Disp: , Rfl: 4    losartan-hydrochlorothiazide (HYZAAR) 100-25 MG per tablet, TK 1 T PO QD, Disp: , Rfl: 4    diclofenac sodium 1 % GEL, MATT 1 GRAM BID, Disp: , Rfl: 2    traMADol (ULTRAM) 50 MG tablet, Take 50 mg by mouth every 6 hours as needed for Pain, Disp: , Rfl:     vitamin D (CHOLECALCIFEROL) 1000 UNIT TABS tablet, Take 1,000 Units by mouth daily, Disp: , Rfl:       No Known Allergies      Review of Systems: 10 point review of systems was completed and is negative unless otherwise noted in HPI        Physical Exam:  Temp 98.6 °F (37 °C)   Ht 5' 5.9\" (1.674 m)   Wt 261 lb (118.4 kg)   BMI 42.25 kg/m²       General: No acute distress, well nourished  CV: No obvious peripheral edema. Normal peripheral pulses  Neuro: Alert & oriented x 3  Psych: Normal mood and affect    Right shoulder: Actively the patient has roughly 120 to 30 degrees of forward flexion  120-130 degrees of abduction  He can internally rotate to T12/L1  5 out of 5 strength with internal rotation, external rotation, abduction  Negative Jurgenson's  Negative speeds  Positive Barillas  Tenderness palpation over the [facility] joint    Left shoulder:   Actively the patient has roughly 120 to 30 degrees of forward flexion  120-130 degrees of abduction  He can internally rotate to L1  5 out of 5 strength with internal rotation, external rotation, abduction  Negative Jurgenson's  Negative speeds  Positive Barillas  Tenderness palpation over the [facility] joint      Radiographic:  X-rays obtained and reviewed in office, reviewed and interpreted by me today:  Views: 3 views of the right shoulder, 3 views of the left shoulder  Impression: Bilateral AC joint arthritis, subacromial spurring evident bilaterally as well. Glenohumeral joint well articulated minimal arthritis noted. Assessment:  Heath Austin is a 62 y.o. male with:  1. Bilateral AC joint arthritis  2. Bilateral subacromial impingement    Impression:  Encounter Diagnosis   Name Primary?  Bilateral shoulder pain, unspecified chronicity Yes       Office Procedures:  Orders Placed This Encounter   Procedures    XR SHOULDER LEFT (MIN 2 VIEWS)     Standing Status:   Future     Number of Occurrences:   1     Standing Expiration Date:   8/3/2021     Order Specific Question:   Reason for exam:     Answer:   Pain    XR SHOULDER RIGHT (MIN 2 VIEWS)     Standing Status:   Future     Number of Occurrences:   1     Standing Expiration Date:   8/3/2021     Order Specific Question:   Reason for exam:     Answer:   Pain       Plan:   We did discuss with the patient that his persistent shoulder pain is likely due to his [facility] joint arthritis as well as subacromial impingement and rotator cuff tendinitis. We did discuss the possibility of repeat injections however after further discussion patient would like to discuss possible surgery. We will obtain an MRI of the right shoulder and have him follow-up after this is completed to further discuss his treatment options. [facility]     08/03/20  10:04 AM    The encounter with Heath Austin was supervised by Dr Ileana Conroy who personally examined the patient and reviewed the plan.      This dictation was performed with a verbal recognition program ([employer]ON) and it was checked for errors. It is possible that there are still dictated errors within this office note. If so, please bring any errors to my attention for an addendum. All efforts were made to ensure that this office note is accurate. Attestation:  I was physically present and performed my own examination of this patient and have discussed the case, including pertinent history and exam findings with the fellow. I agree with the documented assessment and plan. Jayro Barney.  El Ledesma MD

## 2020-08-12 ENCOUNTER — HOSPITAL ENCOUNTER (OUTPATIENT)
Dept: MRI IMAGING | Age: 60
Discharge: HOME | End: 2020-08-12
Payer: MEDICARE

## 2020-08-12 PROCEDURE — 73221 MRI JOINT UPR EXTREM W/O DYE: CPT

## 2020-08-31 ENCOUNTER — OFFICE VISIT (OUTPATIENT)
Dept: ORTHOPEDIC SURGERY | Age: 60
End: 2020-08-31
Payer: MEDICARE

## 2020-08-31 VITALS — HEIGHT: 66 IN | BODY MASS INDEX: 41.95 KG/M2 | TEMPERATURE: 97.7 F | WEIGHT: 261 LBS

## 2020-08-31 PROCEDURE — 4004F PT TOBACCO SCREEN RCVD TLK: CPT | Performed by: ORTHOPAEDIC SURGERY

## 2020-08-31 PROCEDURE — 3017F COLORECTAL CA SCREEN DOC REV: CPT | Performed by: ORTHOPAEDIC SURGERY

## 2020-08-31 PROCEDURE — G8417 CALC BMI ABV UP PARAM F/U: HCPCS | Performed by: ORTHOPAEDIC SURGERY

## 2020-08-31 PROCEDURE — 99213 OFFICE O/P EST LOW 20 MIN: CPT | Performed by: ORTHOPAEDIC SURGERY

## 2020-08-31 PROCEDURE — G8427 DOCREV CUR MEDS BY ELIG CLIN: HCPCS | Performed by: ORTHOPAEDIC SURGERY

## 2020-08-31 NOTE — PROGRESS NOTES
The patient returns today for follow-up of right shoulder MRI. Clinically we feel that impingement and AC arthrosis. He has been injected and been to physical therapy. This provided temporary relief and only about 50% pain relief range. ROS: Pertinent items are noted in HPI. No notes on file    Past Medical History:  No date: Acid reflux  No date: Hypertension     Past Surgical History:  8/12/2019: LUMBAR SPINE SURGERY; Left      Comment:  LEFT L4 AND L5 TRANSFORAMINAL EPIDURAL STEROID INJECTION               WITH FLUOROSCOPY performed by Olvin Odonnell MD at [address]  8/26/2019: [address]; Left      Comment:  LEFT L4 AND L5 TRANSFORAMINAL EPIDURAL STEROID INJECTION               WITH FLUOROSCOPY performed by Olvin Odonnell MD at [address]  2/12/2020: [address]; Left      Comment:  LEFT L4 AND L5 TRANSFORAMINAL EPDIURAL STEROID INJECTION               WITH FLUOROSCOPY ([id number], [id number]) performed by Kathrin Nair MD at [address] [place] [address] reviewed. No pertinent family history.       Social History    Socioeconomic History      Marital status: Legally       Spouse name: None      Number of children: None      Years of education: None      Highest education level: None    Occupational History      None    Social Needs      Financial resource strain: None      Food insecurity        Worry: None        Inability: None      Transportation needs        Medical: None        Non-medical: None    Tobacco Use      Smoking status: Current Every Day Smoker        Packs/day: 1.00        Years: 41.00        Pack years: 39        Types: Cigarettes        Start date: 1977      Smokeless tobacco: Never Used    Substance and Sexual Activity      Alcohol use: None        Comment: occ      Drug use: Never      Sexual activity: None    Lifestyle      Physical activity        Days per week: None        Minutes per session: None      Stress: None impingement and biceps tendinosis. Plan: We discussed different options with the patient. He would like to proceed with arthroscopic subacromial decompression, distal clavicle excision, and probable biceps tenotomy. We explained the procedure and recovery. We will try to get the scheduled and see him back preoperatively.

## 2020-09-08 ENCOUNTER — TELEPHONE (OUTPATIENT)
Dept: ORTHOPEDIC SURGERY | Age: 60
End: 2020-09-08

## 2020-09-08 RX ORDER — MELOXICAM 15 MG/1
15 TABLET ORAL DAILY
Qty: 90 TABLET | Refills: 0 | Status: SHIPPED | OUTPATIENT
Start: 2020-09-08 | End: 2022-02-21

## 2020-09-08 NOTE — TELEPHONE ENCOUNTER
Auth: # [id number]    Date: 09/24/20  Type of SX:  OUTPATIENT  Location: [facility]  CPT: B3139713, [id number], [id number]   DX Code: T29.015  SX area: [facility] [address] [facility] [address]: [address] [place] [address] [address] [facility] [address] 09/24/20 TO 11/08/20

## 2020-09-15 ENCOUNTER — TELEPHONE (OUTPATIENT)
Dept: ORTHOPEDIC SURGERY | Age: 60
End: 2020-09-15

## 2020-09-15 NOTE — TELEPHONE ENCOUNTER
Form faxed -- called and spoke with Raz Albert -- surgery packet emailed to her at [email or web address]. Aste[place] -- went over all appts and time

## 2020-09-21 ENCOUNTER — OFFICE VISIT (OUTPATIENT)
Dept: ORTHOPEDIC SURGERY | Age: 60
End: 2020-09-21

## 2020-09-21 VITALS — HEIGHT: 66 IN | BODY MASS INDEX: 41.95 KG/M2 | TEMPERATURE: 98.6 F | WEIGHT: 261 LBS

## 2020-09-21 PROCEDURE — 99024 POSTOP FOLLOW-UP VISIT: CPT | Performed by: ORTHOPAEDIC SURGERY

## 2020-09-21 RX ORDER — HYDROCODONE BITATRATE AND ACETAMINOPHEN 5; 325 MG/1; MG/1
1-2 TABLET ORAL EVERY 4 HOURS PRN
Qty: 40 TABLET | Refills: 0 | Status: SHIPPED | OUTPATIENT
Start: 2020-09-21 | End: 2020-09-28

## 2020-09-21 NOTE — PROGRESS NOTES
Patient is here for preop visit for his right shoulder. He has by MRI and clinical exam AC arthrosis impingement biceps tendinosis. He is scheduled for right shoulder arthroscopy, subacromial decompression, distal clavicle excision, and probable biceps tenotomy. ROS: Pertinent items are noted in HPI. No notes on file    Past Medical History:  No date: Acid reflux  No date: Hypertension     Past Surgical History:  8/12/2019: LUMBAR SPINE SURGERY; Left      Comment:  LEFT L4 AND L5 TRANSFORAMINAL EPIDURAL STEROID INJECTION               WITH FLUOROSCOPY performed by Kleber Hitchcock MD at [address]  8/26/2019: [address]; Left      Comment:  LEFT L4 AND L5 TRANSFORAMINAL EPIDURAL STEROID INJECTION               WITH FLUOROSCOPY performed by Kleber Hitchcock MD at [address]  2/12/2020: [address]; Left      Comment:  LEFT L4 AND L5 TRANSFORAMINAL EPDIURAL STEROID INJECTION               WITH FLUOROSCOPY ([id number], [id number]) performed by Dmitri Barrientos MD at [address]    No family history on file.       Social History    Socioeconomic History      Marital status: Legally       Spouse name: None      Number of children: None      Years of education: None      Highest education level: None    Occupational History      None    Social Needs      Financial resource strain: None      Food insecurity        Worry: None        Inability: None      Transportation needs        Medical: None        Non-medical: None    Tobacco Use      Smoking status: Current Every Day Smoker        Packs/day: 1.00        Years: 41.00        Pack years: 39        Types: Cigarettes        Start date: 1977      Smokeless tobacco: Never Used    Substance and Sexual Activity      Alcohol use: None        Comment: occ      Drug use: Never      Sexual activity: None    Lifestyle      Physical activity        Days per week: None        Minutes per session: None      Stress: None    Relationships      Social connections        Talks on phone: None        Gets together: None        Attends Rastafarian service: None        Active member of club or organization: None        Attends meetings of clubs or organizations: None        Relationship status: None      Intimate partner violence        Fear of current or ex partner: None        Emotionally abused: None        Physically abused: None        Forced sexual activity: None    Other Topics      Concerns:        None    Social History Narrative      None      Current Outpatient Medications:  meloxicam (MOBIC) 15 MG tablet, TAKE 1 TABLET BY MOUTH DAILY, Disp: 90 tablet, Rfl: 0  methylPREDNISolone (MEDROL, MATT,) 4 MG tablet, Take by mouth as directed., Disp: 1 kit, Rfl: 0  CHANTIX STARTING MONTH MATT 0.5 MG X 11 & 1 MG X 42 tablet, TK 1 T PO QD UTD, Disp: , Rfl:   omeprazole (PRILOSEC) 10 MG delayed release capsule, Take 10 mg by mouth daily, Disp: , Rfl:   traZODone (DESYREL) 100 MG tablet, Take 100 mg by mouth nightly, Disp: , Rfl:   Ascorbic Acid Buffered (BUFFERED C POWDER PO), Take by mouth, Disp: , Rfl:   amLODIPine (NORVASC) 10 MG tablet, TK 1 T PO QD, Disp: , Rfl: 4  losartan-hydrochlorothiazide (HYZAAR) 100-25 MG per tablet, TK 1 T PO QD, Disp: , Rfl: 4  diclofenac sodium 1 % GEL, MATT 1 GRAM BID, Disp: , Rfl: 2  traMADol (ULTRAM) 50 MG tablet, Take 50 mg by mouth every 6 hours as needed for Pain, Disp: , Rfl:   vitamin D (CHOLECALCIFEROL) 1000 UNIT TABS tablet, Take 1,000 Units by mouth daily, Disp: , Rfl:     No current facility-administered medications for this visit. No Known Allergies    VITAL SIGNS:  Temp 98.6 °F (37 °C)   Ht 5' 5.9\" (1.674 m)   Wt 261 lb (118.4 kg)   BMI 42.25 kg/m²   We discussed the risks, benefits, and alternatives of surgery, including but not limited to infection, stiffness, DVT, neurovascular damage, and others. Patient seems understand accept these risks. All questions were answered.   He is given a prescription for Norco.  He was asked to take aspirin after surgery. We will see him Thursday for surgery and Friday first postop visit.

## 2020-09-22 ENCOUNTER — TELEPHONE (OUTPATIENT)
Dept: ORTHOPEDIC SURGERY | Age: 60
End: 2020-09-22

## 2020-09-23 ENCOUNTER — ANESTHESIA EVENT (OUTPATIENT)
Dept: OPERATING ROOM | Age: 60
End: 2020-09-23
Payer: MEDICARE

## 2020-09-23 NOTE — PROGRESS NOTES
Name_______________________________________Printed:____________________  Date and time of surgery__9/24/20___0730___________________Arrival Time:___0600  masc_____________   1. The instructions given regarding when and if a patient needs to stop oral intake prior to surgery varies. Follow the specific instructions you were given                  _x__Nothing to eat or to drink after Midnight the night before.                             ____Endoscopy patient follow your DRS instructions-generally you will be doing a part of the prep after Midnight                   ____Carbo loading or ERAS instructions will be given to select patients-if you have been given those instructions -please do the following                           The evening before your surgery after dinner before midnight drink 40 ounces of gatorade. If you are diabetic use sugar free. The morning of surgery drink 40 ounces of water. This needs to be finished 3 hours prior to your surgery start time. 2. Take the following pills with a small sip of water on the morning of surgery__none_________________________________________________                  Do not take blood pressure medications ending in pril or sartan the deondre prior to surgery or the morning of surgery_   3. Aspirin, Ibuprofen, Advil, Naproxen, Vitamin E and other Anti-inflammatory products and supplements should be stopped for 5 -7days before surgery or as directed by your physician. 4. Check with your Doctor regarding stopping Plavix, Coumadin,Eliquis, Lovenox,Effient,Pradaxa,Xarelto, Fragmin or other blood thinners and follow their instructions. 5. Do not smoke, and do not drink any alcoholic beverages 24 hours prior to surgery. This includes NA Beer. Refrain from the usage of any recreational drugs. 6. You may brush your teeth and gargle the morning of surgery. DO NOT SWALLOW WATER   7.  You MUST make arrangements for a responsible adult to stay on site while you are here and take you home after your surgery. You will not be allowed to leave alone or drive yourself home. It is strongly suggested someone stay with you the first 24 hrs. Your surgery will be cancelled if you do not have a ride home. 8. A parent/legal guardian must accompany a child scheduled for surgery and plan to stay at the hospital until the child is discharged. Please do not bring other children with you. 9. Please wear simple, loose fitting clothing to the hospital.  Junior Babak not bring valuables (money, credit cards, checkbooks, etc.) Do not wear any makeup (including no eye makeup) or nail polish on your fingers or toes. 10. DO NOT wear any jewelry or piercings on day of surgery. All body piercing jewelry must be removed. 11. If you have _x__dentures, they will be removed before going to the OR; we will provide you a container. If you wear ___contact lenses or _x__glasses, they will be removed; please bring a case for them. 12. Please see your family doctor/pediatrician for a history & physical and/or concerning medications. Bring any test results/reports from your physician's office. PCP__________________Phone___________H&P Appt. Date________             13 If you  have a Living Will and Durable Power of  for Healthcare, please bring in a copy. 15. Notify your Surgeon if you develop any illness between now and surgery  time, cough, cold, fever, sore throat, nausea, vomiting, etc.  Please notify your surgeon if you experience dizziness, shortness of breath or blurred vision between now & the time of your surgery             15. DO NOT shave your operative site 96 hours prior to surgery. For face & neck surgery, men may use an electric razor 48 hours prior to surgery. 16. Shower the night before or morning of surgery using an antibacterial soap or as you have been instructed.              17. To provide excellent care visitors will be limited to one in the room at any given time. 18.  Please bring picture ID and insurance card. 19.  Visit our web site for additional information:  [email or web address]              20.During flu season no children under the age of 15 are permitted in the hospital for the safety of all patients. 21. If you take a long acting insulin in the evening only  take half of your usual  dose the night  before your procedure              22. If you use a c-pap please bring DOS if staying overnight,             23.For your convenience [address] [facility] has a pharmacy on site to fill your prescriptions. 24. If you use oxygen and have a portable tank please bring it  with you the DOS             25. Bring a complete list of all your medications with name and dose include any supplements. 26. Other__________________________________________   *Please call pre admission testing if you any further questions   Saeed Wilkins [address]    [facility] [place]  [phone]   [facility] [address] [place]ETO   [phone]  Patient instructed to get their COVID-19 test done as directed by their doctor (5-7 days prior to procedure)  or patient states will get on _9/17/20_________. Patient was notified that they need to have an appointment,number to call provided. The day the COVID test is done is considered day one. Instructed to self quarantine after test until DOS. There is a one visitor policy at [facility] for all surgeries and endoscopies. Whether the visitor can stay or will be asked to wait in the car will depend on the current policy and if social distancing can be maintained. The policy is subject to change at any time. Please make sure the visitor has a cell phone that is on,charged and able to accept calls, as this may be the way that the staff communicates with them. Pain management is NO VISITOR policyThe patients ride is expected to remain in the car with a cell phone for communication. If the ride is leaving the hospital grounds please make sure they are back in time for pickup. Have the patient inform the staff on arrival what their rides plans are while the patient is in the facility. At the MAIN there is one visitor allowed. Please note that the visitor policy is subject to change. All above information reviewed with patient in person or by phone. Patient verbalizes understanding. All questions and concerns addressed.                                                                                                  Patient/Rep_patient___________________                                                                                                                                    PRE OP INSTRUCTIONS

## 2020-09-24 ENCOUNTER — ANESTHESIA (OUTPATIENT)
Dept: OPERATING ROOM | Age: 60
End: 2020-09-24
Payer: MEDICARE

## 2020-09-24 ENCOUNTER — HOSPITAL ENCOUNTER (OUTPATIENT)
Age: 60
Setting detail: OUTPATIENT SURGERY
Discharge: HOME | End: 2020-09-24
Attending: ORTHOPAEDIC SURGERY | Admitting: ORTHOPAEDIC SURGERY
Payer: MEDICARE

## 2020-09-24 VITALS
RESPIRATION RATE: 25 BRPM | SYSTOLIC BLOOD PRESSURE: 146 MMHG | DIASTOLIC BLOOD PRESSURE: 99 MMHG | OXYGEN SATURATION: 81 % | TEMPERATURE: 97 F

## 2020-09-24 VITALS
SYSTOLIC BLOOD PRESSURE: 144 MMHG | WEIGHT: 284.4 LBS | OXYGEN SATURATION: 96 % | RESPIRATION RATE: 17 BRPM | HEIGHT: 65 IN | TEMPERATURE: 98 F | HEART RATE: 85 BPM | DIASTOLIC BLOOD PRESSURE: 86 MMHG | BODY MASS INDEX: 47.38 KG/M2

## 2020-09-24 LAB — SARS-COV-2, NAAT: NOT DETECTED

## 2020-09-24 PROCEDURE — 6360000002 HC RX W HCPCS: Performed by: ANESTHESIOLOGY

## 2020-09-24 PROCEDURE — 64415 NJX AA&/STRD BRCH PLXS IMG: CPT | Performed by: ANESTHESIOLOGY

## 2020-09-24 PROCEDURE — 3600000014 HC SURGERY LEVEL 4 ADDTL 15MIN: Performed by: ORTHOPAEDIC SURGERY

## 2020-09-24 PROCEDURE — 6360000002 HC RX W HCPCS: Performed by: ORTHOPAEDIC SURGERY

## 2020-09-24 PROCEDURE — 2709999900 HC NON-CHARGEABLE SUPPLY: Performed by: ORTHOPAEDIC SURGERY

## 2020-09-24 PROCEDURE — 2500000003 HC RX 250 WO HCPCS: Performed by: ANESTHESIOLOGY

## 2020-09-24 PROCEDURE — 6370000000 HC RX 637 (ALT 250 FOR IP): Performed by: NURSE ANESTHETIST, CERTIFIED REGISTERED

## 2020-09-24 PROCEDURE — 7100000000 HC PACU RECOVERY - FIRST 15 MIN: Performed by: ORTHOPAEDIC SURGERY

## 2020-09-24 PROCEDURE — 2500000003 HC RX 250 WO HCPCS: Performed by: NURSE ANESTHETIST, CERTIFIED REGISTERED

## 2020-09-24 PROCEDURE — 2720000010 HC SURG SUPPLY STERILE: Performed by: ORTHOPAEDIC SURGERY

## 2020-09-24 PROCEDURE — 3600000004 HC SURGERY LEVEL 4 BASE: Performed by: ORTHOPAEDIC SURGERY

## 2020-09-24 PROCEDURE — 6360000002 HC RX W HCPCS: Performed by: NURSE ANESTHETIST, CERTIFIED REGISTERED

## 2020-09-24 PROCEDURE — 2580000003 HC RX 258: Performed by: ORTHOPAEDIC SURGERY

## 2020-09-24 PROCEDURE — 7100000001 HC PACU RECOVERY - ADDTL 15 MIN: Performed by: ORTHOPAEDIC SURGERY

## 2020-09-24 PROCEDURE — 7100000011 HC PHASE II RECOVERY - ADDTL 15 MIN: Performed by: ORTHOPAEDIC SURGERY

## 2020-09-24 PROCEDURE — U0002 COVID-19 LAB TEST NON-CDC: HCPCS

## 2020-09-24 PROCEDURE — 3700000000 HC ANESTHESIA ATTENDED CARE: Performed by: ORTHOPAEDIC SURGERY

## 2020-09-24 PROCEDURE — 7100000010 HC PHASE II RECOVERY - FIRST 15 MIN: Performed by: ORTHOPAEDIC SURGERY

## 2020-09-24 PROCEDURE — 2500000003 HC RX 250 WO HCPCS: Performed by: ORTHOPAEDIC SURGERY

## 2020-09-24 PROCEDURE — 3700000001 HC ADD 15 MINUTES (ANESTHESIA): Performed by: ORTHOPAEDIC SURGERY

## 2020-09-24 RX ORDER — LABETALOL HYDROCHLORIDE 5 MG/ML
5 INJECTION, SOLUTION INTRAVENOUS EVERY 10 MIN PRN
Status: DISCONTINUED | OUTPATIENT
Start: 2020-09-24 | End: 2020-09-24 | Stop reason: HOSPADM

## 2020-09-24 RX ORDER — MIDAZOLAM HYDROCHLORIDE 1 MG/ML
0.5 INJECTION INTRAMUSCULAR; INTRAVENOUS ONCE
Status: COMPLETED | OUTPATIENT
Start: 2020-09-24 | End: 2020-09-24

## 2020-09-24 RX ORDER — LIDOCAINE HYDROCHLORIDE 10 MG/ML
1 INJECTION, SOLUTION EPIDURAL; INFILTRATION; INTRACAUDAL; PERINEURAL
Status: DISCONTINUED | OUTPATIENT
Start: 2020-09-24 | End: 2020-09-24 | Stop reason: HOSPADM

## 2020-09-24 RX ORDER — HYDRALAZINE HYDROCHLORIDE 20 MG/ML
5 INJECTION INTRAMUSCULAR; INTRAVENOUS EVERY 10 MIN PRN
Status: DISCONTINUED | OUTPATIENT
Start: 2020-09-24 | End: 2020-09-24 | Stop reason: HOSPADM

## 2020-09-24 RX ORDER — PROCHLORPERAZINE EDISYLATE 5 MG/ML
5 INJECTION INTRAMUSCULAR; INTRAVENOUS
Status: DISCONTINUED | OUTPATIENT
Start: 2020-09-24 | End: 2020-09-24 | Stop reason: HOSPADM

## 2020-09-24 RX ORDER — LIDOCAINE HYDROCHLORIDE 40 MG/ML
SOLUTION TOPICAL PRN
Status: DISCONTINUED | OUTPATIENT
Start: 2020-09-24 | End: 2020-09-24 | Stop reason: SDUPTHER

## 2020-09-24 RX ORDER — FENTANYL CITRATE 50 UG/ML
50 INJECTION, SOLUTION INTRAMUSCULAR; INTRAVENOUS ONCE
Status: COMPLETED | OUTPATIENT
Start: 2020-09-24 | End: 2020-09-24

## 2020-09-24 RX ORDER — HYDROCODONE BITARTRATE AND ACETAMINOPHEN 5; 325 MG/1; MG/1
1 TABLET ORAL
Status: DISCONTINUED | OUTPATIENT
Start: 2020-09-24 | End: 2020-09-24 | Stop reason: HOSPADM

## 2020-09-24 RX ORDER — LIDOCAINE HYDROCHLORIDE 20 MG/ML
INJECTION, SOLUTION EPIDURAL; INFILTRATION; INTRACAUDAL; PERINEURAL PRN
Status: DISCONTINUED | OUTPATIENT
Start: 2020-09-24 | End: 2020-09-24 | Stop reason: SDUPTHER

## 2020-09-24 RX ORDER — FENTANYL CITRATE 50 UG/ML
25 INJECTION, SOLUTION INTRAMUSCULAR; INTRAVENOUS EVERY 5 MIN PRN
Status: DISCONTINUED | OUTPATIENT
Start: 2020-09-24 | End: 2020-09-24 | Stop reason: HOSPADM

## 2020-09-24 RX ORDER — HYDROMORPHONE HCL 110MG/55ML
0.5 PATIENT CONTROLLED ANALGESIA SYRINGE INTRAVENOUS EVERY 5 MIN PRN
Status: DISCONTINUED | OUTPATIENT
Start: 2020-09-24 | End: 2020-09-24 | Stop reason: HOSPADM

## 2020-09-24 RX ORDER — SODIUM CHLORIDE 0.9 % (FLUSH) 0.9 %
10 SYRINGE (ML) INJECTION PRN
Status: DISCONTINUED | OUTPATIENT
Start: 2020-09-24 | End: 2020-09-24 | Stop reason: HOSPADM

## 2020-09-24 RX ORDER — SODIUM CHLORIDE, SODIUM LACTATE, POTASSIUM CHLORIDE, CALCIUM CHLORIDE 600; 310; 30; 20 MG/100ML; MG/100ML; MG/100ML; MG/100ML
INJECTION, SOLUTION INTRAVENOUS CONTINUOUS
Status: DISCONTINUED | OUTPATIENT
Start: 2020-09-24 | End: 2020-09-24 | Stop reason: HOSPADM

## 2020-09-24 RX ORDER — LIDOCAINE HYDROCHLORIDE 10 MG/ML
0.5 INJECTION, SOLUTION EPIDURAL; INFILTRATION; INTRACAUDAL; PERINEURAL ONCE
Status: DISCONTINUED | OUTPATIENT
Start: 2020-09-24 | End: 2020-09-24 | Stop reason: HOSPADM

## 2020-09-24 RX ORDER — ONDANSETRON 2 MG/ML
4 INJECTION INTRAMUSCULAR; INTRAVENOUS
Status: DISCONTINUED | OUTPATIENT
Start: 2020-09-24 | End: 2020-09-24 | Stop reason: HOSPADM

## 2020-09-24 RX ORDER — DEXAMETHASONE SODIUM PHOSPHATE 4 MG/ML
INJECTION, SOLUTION INTRA-ARTICULAR; INTRALESIONAL; INTRAMUSCULAR; INTRAVENOUS; SOFT TISSUE PRN
Status: DISCONTINUED | OUTPATIENT
Start: 2020-09-24 | End: 2020-09-24 | Stop reason: SDUPTHER

## 2020-09-24 RX ORDER — MIDAZOLAM HYDROCHLORIDE 1 MG/ML
1 INJECTION INTRAMUSCULAR; INTRAVENOUS ONCE
Status: COMPLETED | OUTPATIENT
Start: 2020-09-24 | End: 2020-09-24

## 2020-09-24 RX ORDER — PROPOFOL 10 MG/ML
INJECTION, EMULSION INTRAVENOUS PRN
Status: DISCONTINUED | OUTPATIENT
Start: 2020-09-24 | End: 2020-09-24 | Stop reason: SDUPTHER

## 2020-09-24 RX ORDER — GLYCOPYRROLATE 1 MG/5 ML
SYRINGE (ML) INTRAVENOUS PRN
Status: DISCONTINUED | OUTPATIENT
Start: 2020-09-24 | End: 2020-09-24 | Stop reason: SDUPTHER

## 2020-09-24 RX ORDER — BUPIVACAINE HYDROCHLORIDE 5 MG/ML
INJECTION, SOLUTION EPIDURAL; INTRACAUDAL
Status: COMPLETED | OUTPATIENT
Start: 2020-09-24 | End: 2020-09-24

## 2020-09-24 RX ORDER — ONDANSETRON 2 MG/ML
INJECTION INTRAMUSCULAR; INTRAVENOUS PRN
Status: DISCONTINUED | OUTPATIENT
Start: 2020-09-24 | End: 2020-09-24 | Stop reason: SDUPTHER

## 2020-09-24 RX ORDER — SUCCINYLCHOLINE/SOD CL,ISO/PF 200MG/10ML
SYRINGE (ML) INTRAVENOUS PRN
Status: DISCONTINUED | OUTPATIENT
Start: 2020-09-24 | End: 2020-09-24 | Stop reason: SDUPTHER

## 2020-09-24 RX ORDER — SODIUM CHLORIDE 0.9 % (FLUSH) 0.9 %
10 SYRINGE (ML) INJECTION EVERY 12 HOURS SCHEDULED
Status: DISCONTINUED | OUTPATIENT
Start: 2020-09-24 | End: 2020-09-24 | Stop reason: HOSPADM

## 2020-09-24 RX ORDER — ROCURONIUM BROMIDE 10 MG/ML
INJECTION, SOLUTION INTRAVENOUS PRN
Status: DISCONTINUED | OUTPATIENT
Start: 2020-09-24 | End: 2020-09-24 | Stop reason: SDUPTHER

## 2020-09-24 RX ORDER — FENTANYL CITRATE 50 UG/ML
INJECTION, SOLUTION INTRAMUSCULAR; INTRAVENOUS PRN
Status: DISCONTINUED | OUTPATIENT
Start: 2020-09-24 | End: 2020-09-24 | Stop reason: SDUPTHER

## 2020-09-24 RX ADMIN — ROCURONIUM BROMIDE 5 MG: 10 INJECTION, SOLUTION INTRAVENOUS at 07:35

## 2020-09-24 RX ADMIN — FENTANYL CITRATE 25 MCG: 50 INJECTION, SOLUTION INTRAMUSCULAR; INTRAVENOUS at 08:55

## 2020-09-24 RX ADMIN — CEFAZOLIN SODIUM 2 G: 10 INJECTION, POWDER, FOR SOLUTION INTRAVENOUS at 07:24

## 2020-09-24 RX ADMIN — SODIUM CHLORIDE, POTASSIUM CHLORIDE, SODIUM LACTATE AND CALCIUM CHLORIDE: 600; 310; 30; 20 INJECTION, SOLUTION INTRAVENOUS at 06:49

## 2020-09-24 RX ADMIN — DEXAMETHASONE SODIUM PHOSPHATE 10 MG: 4 INJECTION, SOLUTION INTRAMUSCULAR; INTRAVENOUS at 07:53

## 2020-09-24 RX ADMIN — LIDOCAINE HYDROCHLORIDE 100 MG: 20 INJECTION, SOLUTION EPIDURAL; INFILTRATION; INTRACAUDAL; PERINEURAL at 07:35

## 2020-09-24 RX ADMIN — ONDANSETRON 4 MG: 2 INJECTION INTRAMUSCULAR; INTRAVENOUS at 09:09

## 2020-09-24 RX ADMIN — Medication 0.2 MG: at 07:44

## 2020-09-24 RX ADMIN — LIDOCAINE HYDROCHLORIDE 4 ML: 40 SOLUTION TOPICAL at 07:37

## 2020-09-24 RX ADMIN — MIDAZOLAM 0.5 MG: 1 INJECTION INTRAMUSCULAR; INTRAVENOUS at 07:05

## 2020-09-24 RX ADMIN — PROPOFOL 200 MG: 10 INJECTION, EMULSION INTRAVENOUS at 07:35

## 2020-09-24 RX ADMIN — FENTANYL CITRATE 25 MCG: 50 INJECTION, SOLUTION INTRAMUSCULAR; INTRAVENOUS at 08:33

## 2020-09-24 RX ADMIN — FENTANYL CITRATE 50 MCG: 50 INJECTION, SOLUTION INTRAMUSCULAR; INTRAVENOUS at 07:35

## 2020-09-24 RX ADMIN — FENTANYL CITRATE 25 MCG: 50 INJECTION, SOLUTION INTRAMUSCULAR; INTRAVENOUS at 07:06

## 2020-09-24 RX ADMIN — SODIUM CHLORIDE, POTASSIUM CHLORIDE, SODIUM LACTATE AND CALCIUM CHLORIDE: 600; 310; 30; 20 INJECTION, SOLUTION INTRAVENOUS at 08:20

## 2020-09-24 RX ADMIN — MIDAZOLAM 0.5 MG: 1 INJECTION INTRAMUSCULAR; INTRAVENOUS at 07:11

## 2020-09-24 RX ADMIN — TRANEXAMIC ACID 1000 MG: 1 INJECTION, SOLUTION INTRAVENOUS at 08:07

## 2020-09-24 RX ADMIN — BUPIVACAINE HYDROCHLORIDE 26 ML: 5 INJECTION, SOLUTION EPIDURAL; INTRACAUDAL at 07:20

## 2020-09-24 RX ADMIN — Medication 200 MG: at 07:38

## 2020-09-24 ASSESSMENT — PULMONARY FUNCTION TESTS
PIF_VALUE: 31
PIF_VALUE: 32
PIF_VALUE: 2
PIF_VALUE: 31
PIF_VALUE: 33
PIF_VALUE: 32
PIF_VALUE: 3
PIF_VALUE: 28
PIF_VALUE: 29
PIF_VALUE: 32
PIF_VALUE: 31
PIF_VALUE: 32
PIF_VALUE: 30
PIF_VALUE: 31
PIF_VALUE: 33
PIF_VALUE: 32
PIF_VALUE: 31
PIF_VALUE: 31
PIF_VALUE: 28
PIF_VALUE: 2
PIF_VALUE: 35
PIF_VALUE: 31
PIF_VALUE: 32
PIF_VALUE: 31
PIF_VALUE: 29
PIF_VALUE: 28
PIF_VALUE: 31
PIF_VALUE: 3
PIF_VALUE: 32
PIF_VALUE: 32
PIF_VALUE: 3
PIF_VALUE: 32
PIF_VALUE: 3
PIF_VALUE: 29
PIF_VALUE: 32
PIF_VALUE: 31
PIF_VALUE: 31
PIF_VALUE: 33
PIF_VALUE: 31
PIF_VALUE: 2
PIF_VALUE: 27
PIF_VALUE: 31
PIF_VALUE: 28
PIF_VALUE: 33
PIF_VALUE: 4
PIF_VALUE: 31
PIF_VALUE: 34
PIF_VALUE: 32
PIF_VALUE: 32
PIF_VALUE: 33
PIF_VALUE: 5
PIF_VALUE: 27
PIF_VALUE: 31
PIF_VALUE: 1
PIF_VALUE: 29
PIF_VALUE: 32
PIF_VALUE: 31
PIF_VALUE: 35
PIF_VALUE: 32
PIF_VALUE: 32
PIF_VALUE: 31
PIF_VALUE: 1
PIF_VALUE: 33
PIF_VALUE: 32
PIF_VALUE: 29
PIF_VALUE: 30
PIF_VALUE: 34
PIF_VALUE: 32
PIF_VALUE: 1
PIF_VALUE: 31
PIF_VALUE: 12
PIF_VALUE: 32
PIF_VALUE: 32
PIF_VALUE: 33
PIF_VALUE: 30
PIF_VALUE: 32
PIF_VALUE: 31
PIF_VALUE: 32
PIF_VALUE: 29
PIF_VALUE: 4
PIF_VALUE: 29
PIF_VALUE: 28
PIF_VALUE: 34
PIF_VALUE: 1
PIF_VALUE: 32
PIF_VALUE: 31
PIF_VALUE: 32
PIF_VALUE: 33
PIF_VALUE: 31
PIF_VALUE: 31
PIF_VALUE: 32
PIF_VALUE: 32
PIF_VALUE: 2
PIF_VALUE: 0

## 2020-09-24 ASSESSMENT — PAIN - FUNCTIONAL ASSESSMENT: PAIN_FUNCTIONAL_ASSESSMENT: 0-10

## 2020-09-24 ASSESSMENT — ENCOUNTER SYMPTOMS: SHORTNESS OF BREATH: 0

## 2020-09-24 ASSESSMENT — PAIN SCALES - GENERAL: PAINLEVEL_OUTOF10: 0

## 2020-09-24 NOTE — ANESTHESIA POSTPROCEDURE EVALUATION
Department of Anesthesiology  Postprocedure Note    Patient: Anam Batista  MRN: [id number]  YOB: 1960  Date of evaluation: 9/24/2020  Time:  10:23 AM     Procedure Summary     Date:  09/24/20 Room / Location:  [address]    Anesthesia Start:  0730 Anesthesia Stop:  6846    Procedure:  RIGHT SHOULDER SCOPE; SUBACROMIAL DECOMPRESSION; DISTAL CLAVICLE EXCISION; BICEPS TENOTOMY (72353,[id number]) (Right Shoulder) Diagnosis:       Subacromial impingement, right      Arthritis of right shoulder region      (M75.41 SUBACROMIAL IMPINGEMENT, RIGHT;  M19.011 RIGHT A-C ARTHROSIS)    Surgeon:  Eitan Waters MD Responsible Provider:  Clement Pineda MD    Anesthesia Type:  general ASA Status:  3          Anesthesia Type: general    Art Phase I: Atr Score: 10    Art Phase II:      Last vitals: Reviewed and per EMR flowsheets.        Anesthesia Post Evaluation    Patient location during evaluation: PACU  Patient participation: complete - patient participated  Level of consciousness: awake and alert  Airway patency: patent  Nausea & Vomiting: no nausea and no vomiting  Complications: no  Cardiovascular status: hemodynamically stable  Respiratory status: acceptable  Hydration status: stable

## 2020-09-24 NOTE — PROGRESS NOTES
Preop block complete. See anesthesia notes. Patient tolerated procedure with no difficulty. VSS. No distress noted. Medicated per orders for block. See MAR.   May Miller RN

## 2020-09-24 NOTE — PROGRESS NOTES
Received from or - drowsy,nasal cannula,right shoulder dressing dry and intact,elevatrd,circulation good,ice placed,teds,vss.

## 2020-09-24 NOTE — ANESTHESIA PROCEDURE NOTES
Peripheral Block    Patient location during procedure: pre-op  Start time: 9/24/2020 7:11 AM  End time: 9/24/2020 7:19 AM  Staffing  Anesthesiologist: George Pitt MD  Resident/CRNA: EUGENIA Pfeiffer CRNA  Performed: resident/CRNA   Preanesthetic Checklist  Completed: patient identified, site marked, surgical consent, pre-op evaluation, timeout performed, IV checked, risks and benefits discussed, monitors and equipment checked, anesthesia consent given, oxygen available and patient being monitored  Peripheral Block  Patient position: sitting  Prep: ChloraPrep  Patient monitoring: cardiac monitor, continuous pulse ox, frequent blood pressure checks and IV access  Block type: Brachial plexus  Laterality: right  Injection technique: single-shot  Procedures: ultrasound guided and nerve stimulator  Local infiltration: lidocaine  Infiltration strength: 1 %  Dose: 3 mL  Interscalene  Provider prep: mask and sterile gloves  Local infiltration: lidocaine  Needle  Needle type: short-bevel   Needle gauge: 21 G  Needle length: 10 cm  Needle localization: ultrasound guidance  Assessment  Injection assessment: negative aspiration for heme, no paresthesia on injection and local visualized surrounding nerve on ultrasound  Paresthesia pain: none  Slow fractionated injection: yes  Hemodynamics: stable  Additional Notes  U/S [id number].  (1) Under ultrasound guidance, a 21 gauge needle was inserted and placed in close proximity to the BP nerve.  (2) Ultrasound was also used to visualize the spread of the anesthetic in close proximity to the nerve being blocked. (3) The nerve appeared anatomically normal, and (4 there were no apparent abnormal pathological findings on the image that were readily visible and related to the nerve being blocked. (5) A permanent ultrasound image was saved in the patient's record.             Medications Administered  Bupivacaine (MARCAINE) PF injection 0.5%, 26 mL  Reason for block: post-op pain management and at surgeon's request

## 2020-09-24 NOTE — BRIEF OP NOTE
Brief Postoperative Note      Patient: Nila Jackson  YOB: 1960  MRN: [id number]    Date of Procedure: 9/24/2020    Pre-Op Diagnosis: M75.41 SUBACROMIAL IMPINGEMENT, RIGHT;  M19.011 RIGHT A-C ARTHROSIS    Post-Op Diagnosis: Same       Procedures:    Right shoulder arthroscopy, distal clavicle excision, acromioplasty, synovectomy      Surgeon(s):  Prudencio Bassett MD    Assistant:  Milo Ghotra MD    Anesthesia: General    Estimated Blood Loss (mL): less than 50 cc    Complications: None    Specimens:   * No specimens in log *    Implants:  * No implants in log *      Drains: * No LDAs found *    Findings: refer to formal operative report     Electronically signed by Lisa Castro MD on 9/24/2020 at 9:29 AM

## 2020-09-24 NOTE — ANESTHESIA PRE PROCEDURE
1 % injection 0.5 mL  0.5 mL Intradermal Once Dixie Orellana MD        ceFAZolin (ANCEF) 2 g in dextrose 5 % 100 mL IVPB  2 g Intravenous On Call to [address] Pastrana MD           Allergies:  No Known Allergies    Problem List:    Patient Active Problem List   Diagnosis Code    Subjective tinnitus of both ears H93.13    Bilateral hearing loss H91.93    Bilateral sensorineural hearing loss H90.3       Past Medical History:        Diagnosis Date    Acid reflux     Back pain     Hypertension     Sleep apnea     no cpap    Spinal stenosis     Tinnitus     right ear mostly       Past Surgical History:        Procedure Laterality Date    LUMBAR SPINE SURGERY Left 2019    LEFT L4 AND L5 TRANSFORAMINAL EPIDURAL STEROID INJECTION WITH FLUOROSCOPY performed by Yoana Cortes MD at [address] [place] [address] Left 2019    LEFT L4 AND L5 TRANSFORAMINAL EPIDURAL STEROID INJECTION WITH FLUOROSCOPY performed by Yoana Cortes MD at [address] [place] [address] Left 2020    LEFT L4 AND L5 TRANSFORAMINAL EPDIURAL STEROID INJECTION WITH FLUOROSCOPY (83079, 60167) performed by Yoana Cortes MD at [place] [address] History:    Social History     Tobacco Use    Smoking status: Former Smoker     Packs/day: 1.00     Years: 41.00     Pack years: 41.00     Types: Cigarettes     Start date:      Last attempt to quit: 2/15/2020     Years since quittin.6    Smokeless tobacco: Never Used   Substance Use Topics    Alcohol use: Not Currently     Comment: occ                                Counseling given: Not Answered      Vital Signs (Current):   Vitals:    20 0913 20 0637   BP:  (!) 153/92   Pulse:  78   Resp:  18   Temp:  98.8 °F (37.1 °C)   TempSrc:  Temporal   SpO2:  95%   Weight: 280 lb (127 kg) 284 lb 6.4 oz (129 kg)   Height: 5' 5\" (1.651 m) 5' 5\" (1.651 m)                                              BP Readings from Last 3 Encounters: 09/24/20 (!) 153/92   04/02/20 132/86   03/04/20 130/88       NPO Status:                                                                                 BMI:   Wt Readings from Last 3 Encounters:   09/24/20 284 lb 6.4 oz (129 kg)   09/21/20 261 lb (118.4 kg)   08/31/20 261 lb (118.4 kg)     Body mass index is 47.33 kg/m². CBC: No results found for: WBC, RBC, HGB, HCT, MCV, RDW, PLT    CMP: No results found for: NA, K, CL, CO2, BUN, CREATININE, GFRAA, AGRATIO, LABGLOM, GLUCOSE, PROT, CALCIUM, BILITOT, ALKPHOS, AST, ALT    POC Tests: No results for input(s): POCGLU, POCNA, POCK, POCCL, POCBUN, POCHEMO, POCHCT in the last 72 hours. Coags: No results found for: PROTIME, INR, APTT    HCG (If Applicable): No results found for: PREGTESTUR, PREGSERUM, HCG, HCGQUANT     ABGs: No results found for: PHART, PO2ART, VIX0NJL, LHT8PZG, BEART, J9DEVGRR     Type & Screen (If Applicable):  No results found for: LABABO, LABRH    Drug/Infectious Status (If Applicable):  No results found for: HIV, HEPCAB    COVID-19 Screening (If Applicable):   Lab Results   Component Value Date    COVID19 Not Detected 09/24/2020         Anesthesia Evaluation  Patient summary reviewed and Nursing notes reviewed no history of anesthetic complications:   Airway: Mallampati: II  TM distance: >3 FB   Neck ROM: full  Mouth opening: > = 3 FB Dental:    (+) upper dentures and lower dentures      Pulmonary:   (+) sleep apnea:      (-) asthma and shortness of breath                           Cardiovascular:  Exercise tolerance: good (>4 METS),   (+) hypertension:,     (-)  angina                Neuro/Psych:      (-) CVA           GI/Hepatic/Renal:   (+) GERD:, morbid obesity     (-) liver disease       Endo/Other:        (-) diabetes mellitus, hypothyroidism               Abdominal:           Vascular:     - PVD. Anesthesia Plan      general     ASA 3       Induction: intravenous.     MIPS: Postoperative opioids intended

## 2020-09-25 ENCOUNTER — HOSPITAL ENCOUNTER (OUTPATIENT)
Dept: PHYSICAL THERAPY | Age: 60
Setting detail: THERAPIES SERIES
Discharge: HOME | End: 2020-09-25
Payer: MEDICARE

## 2020-09-25 ENCOUNTER — OFFICE VISIT (OUTPATIENT)
Dept: ORTHOPEDIC SURGERY | Age: 60
End: 2020-09-25

## 2020-09-25 VITALS — WEIGHT: 284 LBS | BODY MASS INDEX: 47.32 KG/M2 | TEMPERATURE: 98.6 F | HEIGHT: 65 IN

## 2020-09-25 PROCEDURE — 97110 THERAPEUTIC EXERCISES: CPT | Performed by: PHYSICAL THERAPIST

## 2020-09-25 PROCEDURE — 99024 POSTOP FOLLOW-UP VISIT: CPT | Performed by: ORTHOPAEDIC SURGERY

## 2020-09-25 PROCEDURE — 97161 PT EVAL LOW COMPLEX 20 MIN: CPT | Performed by: PHYSICAL THERAPIST

## 2020-09-25 NOTE — PROGRESS NOTES
The patient returns 1 day status post right shoulder subacromial decompression distal clavicle excision and extensive glenohumeral debridement. He is doing well. His portals are clean and dry. He is performing therapy without problems. ROS: Pertinent items are noted in HPI. No notes on file    Past Medical History:  No date: Acid reflux  No date: Back pain  No date: Hypertension  No date: Sleep apnea      Comment:  no cpap  No date: Spinal stenosis  No date: Tinnitus      Comment:  right ear mostly     Past Surgical History:  8/12/2019: LUMBAR SPINE SURGERY; Left      Comment:  LEFT L4 AND L5 TRANSFORAMINAL EPIDURAL STEROID INJECTION               WITH FLUOROSCOPY performed by Christ Juarez MD at [address]  8/26/2019: [address]; Left      Comment:  LEFT L4 AND L5 TRANSFORAMINAL EPIDURAL STEROID INJECTION               WITH FLUOROSCOPY performed by Christ Juarez MD at [address]  [facility]; Left      Comment:  LEFT L4 AND L5 TRANSFORAMINAL EPDIURAL STEROID INJECTION               WITH FLUOROSCOPY ([id number], [id number]) performed by Pamela Conte MD at [address] [facility]  9/24/2020: SHOULDER ARTHROSCOPY;  Right      Comment:  RIGHT SHOULDER SCOPE; SUBACROMIAL DECOMPRESSION; DISTAL                CLAVICLE EXCISION; BICEPS TENOTOMY ([id number])                performed by Nabor Gomez MD at [facility]    Review of patient's family history indicates:  Problem: High Blood Pressure      Relation: Mother          Age of Onset: (Not Specified)  Problem: Diabetes      Relation: Mother          Age of Onset: (Not Specified)      Social History    Socioeconomic History      Marital status: Legally       Spouse name: None      Number of children: None      Years of education: None      Highest education level: None    Occupational History      None    Social Needs      Financial resource strain: None      Food insecurity Worry: None        Inability: None      Transportation needs        Medical: None        Non-medical: None    Tobacco Use      Smoking status: Former Smoker        Packs/day: 1.00        Years: 41.00        Pack years: 39        Types: Cigarettes        Start date:         Quit date: 2/15/2020        Years since quittin.6      Smokeless tobacco: Never Used    Substance and Sexual Activity      Alcohol use: Not Currently        Comment: occ      Drug use: Never      Sexual activity: None    Lifestyle      Physical activity        Days per week: None        Minutes per session: None      Stress: None    Relationships      Social connections        Talks on phone: None        Gets together: None        Attends Congregational service: None        Active member of club or organization: None        Attends meetings of clubs or organizations: None        Relationship status: None      Intimate partner violence        Fear of current or ex partner: None        Emotionally abused: None        Physically abused: None        Forced sexual activity: None    Other Topics      Concerns:        None    Social History Narrative      None      Current Outpatient Medications:  NORCO 5-325 MG per tablet, Take 1-2 tablets by mouth every 4 hours as needed for Pain for up to 7 days.  1-2 tablets every 4 hours as needed for pain, Disp: 40 tablet, Rfl: 0  meloxicam (MOBIC) 15 MG tablet, TAKE 1 TABLET BY MOUTH DAILY, Disp: 90 tablet, Rfl: 0  CHANTIX STARTING MONTH MATT 0.5 MG X 11 & 1 MG X 42 tablet, TK 1 T PO QD UTD, Disp: , Rfl:   omeprazole (PRILOSEC) 10 MG delayed release capsule, Take 10 mg by mouth nightly , Disp: , Rfl:   traZODone (DESYREL) 100 MG tablet, Take 100 mg by mouth nightly, Disp: , Rfl:   Ascorbic Acid Buffered (BUFFERED C POWDER PO), Take by mouth, Disp: , Rfl:   amLODIPine (NORVASC) 10 MG tablet, nightly , Disp: , Rfl: 4  losartan-hydrochlorothiazide (HYZAAR) 100-25 MG per tablet, TK 1 T PO QD, Disp: , Rfl: 4  diclofenac sodium 1 % GEL, MATT 1 GRAM BID, Disp: , Rfl: 2  traMADol (ULTRAM) 50 MG tablet, Take 50 mg by mouth every 6 hours as needed for Pain, Disp: , Rfl:   vitamin D (CHOLECALCIFEROL) 1000 UNIT TABS tablet, Take 1,000 Units by mouth daily, Disp: , Rfl:     No current facility-administered medications for this visit. No Known Allergies    VITAL SIGNS:  Temp 98.6 °F (37 °C)   Ht 5' 5\" (1.651 m)   Wt 284 lb (128.8 kg)   BMI 47.26 kg/m²   We went over his pictures and showed him what we did. We answered all questions. He will start outpatient physical therapy. We will see him back in a month. He is to call for any problems.

## 2020-09-25 NOTE — FLOWSHEET NOTE
[facility]    Physical Therapy Daily Treatment Note  Date:  2020    Patient Name:  Ami Maritnez    :  1960  MRN: [id number]  Restrictions/Precautions:    Medical/Treatment Diagnosis Information:  · Diagnosis: s/p Right shoulder SAD/DCE/debridement    · DOS: 20   ·  ICD10:M75.41 (ICD-10-CM) - Subacromial impingement, right M75.21 (ICD-10-CM) - Biceps tendinitis of right upper extremity M75.81 (ICD-10-CM) - Right rotator cuff tendinitis  · Treatment Diagnosis: decreased ROM; decreased strength; increased pain  Insurance/Certification information:  PT Insurance Information: [employer]  Physician Information:  Referring Practitioner: Sam Ogden MD  Has the plan of care been signed (Y/N):        []  Yes  [x]  No     Date of Patient follow up with Physician: 4 weeks      Is this a Progress Report:     []  Yes  [x]  No        If Yes:  Date Range for reporting period:  Beginnin20  Ending    Progress report will be due (10 Rx or 30 days whichever is less):       Recertification will be due (POC Duration  / 90 days whichever is less): 20         Visit # Insurance Allowable Auth Required   1 No visit limit []  Yes []  No        Functional Scale: UEFI= 0%   Date assessed: 20    Latex Allergy:  [x]NO      []YES  Preferred Language for Healthcare:   [x]English       []other:    Pain level:  8/10     SUBJECTIVE:  See eval    OBJECTIVE: See eval   Observation: bandages removed today. Wounds are clean and dry.     Test measurements:      RESTRICTIONS/PRECAUTIONS:     Exercises/Interventions:   Exercises:  Exercise/Equipment Resistance/Repetitions Other comments   Stretching/PROM     Wand     Table Slides Flexion [id number]    UE [place]     Pulleys     Pendulum     Elbow passive ROM [id number]    Isometrics     Retraction      3x10    Weight shift     Flexion     Abduction     External Rotation     Internal Rotation Biceps     Triceps          PRE's     Flexion     Abduction     External Rotation     Internal Rotation     Shrugs     EXT     Reverse Flys     Serratus     Horizontal Abd with ER     Biceps     Triceps     Retraction          Cable Column/Theraband     External Rotation     Internal Rotation     Shrugs     Lats     Ext     Flex     Scapular Retraction     BIC     TRIC     PNF          Dynamic Stability          Plyoback          Manual interventions                 Patient Education:  Access Code: [id number]   URL: [email or web address]   Date: 09/25/2020   Prepared by: Camille Steel     Exercises  Seated Shoulder Flexion Towel Slide at Table Top - 10 reps - 1 sets - 10sec hold - 2x daily - 7x weekly  Supported Elbow Flexion Extension PROM - 10 reps - 1 sets - 10sec hold - 2x daily - 7x weekly  Gripping Sponge Pronated - 10 reps - 3 sets - 2x daily - 7x weekly    Therapeutic Exercise and NMR EXR  [x] (71693) Provided verbal/tactile cueing for activities related to strengthening, flexibility, endurance, ROM  for improvements in scapular, scapulothoracic and UE control with self care, reaching, carrying, lifting, house/yardwork, driving/computer work.    [] (40606) Provided verbal/tactile cueing for activities related to improving balance, coordination, kinesthetic sense, posture, motor skill, proprioception  to assist with  scapular, scapulothoracic and UE control with self care, reaching, carrying, lifting, house/yardwork, driving/computer work. Therapeutic Activities:    [] (14710 or 17088) Provided verbal/tactile cueing for activities related to improving balance, coordination, kinesthetic sense, posture, motor skill, proprioception and motor activation to allow for proper function of scapular, scapulothoracic and UE control with self care, carrying, lifting, driving/computer work.      Home Exercise Program:    [x] (77085) Reviewed/Progressed HEP activities related to strengthening, flexibility, endurance, ROM of scapular, scapulothoracic and UE control with self care, reaching, carrying, lifting, house/yardwork, driving/computer work  [] (91416) Reviewed/Progressed HEP activities related to improving balance, coordination, kinesthetic sense, posture, motor skill, proprioception of scapular, scapulothoracic and UE control with self care, reaching, carrying, lifting, house/yardwork, driving/computer work      Manual Treatments:  PROM / STM / Oscillations-Mobs:  G-I, II, III, IV (PA's, Inf., Post.)  [] (63865) Provided manual therapy to mobilize soft tissue/joints of cervical/CT, scapular GHJ and UE for the purpose of modulating pain, promoting relaxation,  increasing ROM, reducing/eliminating soft tissue swelling/inflammation/restriction, improving soft tissue extensibility and allowing for proper ROM for normal function with self care, reaching, carrying, lifting, house/yardwork, driving/computer work    Modalities:  Ice x 15 min    Charges:  Timed Code Treatment Minutes: 15   Total Treatment Minutes: 60       [x] EVAL (LOW) 67076 (typically 20 minutes face-to-face)  [] EVAL (MOD) 08757 (typically 30 minutes face-to-face)  [] EVAL (HIGH) 28391 (typically 45 minutes face-to-face)  [] RE-EVAL     [x] RN(61511) x  1   [] IONTO  [] NMR (99441) x     [] VASO  [] Manual (19869) x      [] Other:  [] TA x      [] Mech Traction (13773)  [] ES(attended) (66290)      [] ES (un) (40801):     GOALS:   Patient stated goal: return to regular activities and fishing. []? Progressing: []? Met: []? Not Met: []? Adjusted    Therapist goals for Patient:   Short Term Goals: To be achieved in: 2 weeks  1. Independent in HEP and progression per patient tolerance, in order to prevent re-injury. []? Progressing: []? Met: []? Not Met: []? Adjusted   2. Patient will have a decrease in pain to facilitate improvement in movement, function, and ADLs as indicated by Functional Deficits. []? Progressing: []? Met: []?  Not Met: []? Adjusted     Long Term Goals: To be achieved in: 12 weeks  1. Disability index score of 30% or less for the UEFS to assist with reaching prior level of function. []? Progressing: []? Met: []? Not Met: []? Adjusted  2. Patient will demonstrate increased AROM to [facility] to allow for proper joint functioning as indicated by patients Functional Deficits. []? Progressing: []? Met: []? Not Met: []? Adjusted  3. Patient will demonstrate an increase in strength to good scapular and core control  for UE to allow for proper functional mobility as indicated by patients Functional Deficits. []? Progressing: []? Met: []? Not Met: []? Adjusted  4. Patient will return to ADL and household functional activities without increased symptoms or restriction. []? Progressing: []? Met: []? Not Met: []? Adjusted  5. Pt will return to recreational activities. []? Progressing: []? Met: []? Not Met: []? Adjusted    Overall Progression Towards Functional goals/ Treatment Progress Update:  [] Patient is progressing as expected towards functional goals listed. [] Progression is slowed due to complexities/Impairments listed. [] Progression has been slowed due to co-morbidities.   [x] Plan just implemented, too soon to assess goals progression <30days   [] Goals require adjustment due to lack of progress  [] Patient is not progressing as expected and requires additional follow up with physician  [] Other    Prognosis for POC: [x] Good [] Fair  [] Poor      Patient requires continued skilled intervention: [x] Yes  [] No    Treatment/Activity Tolerance:  [] Patient able to complete treatment  [x] Patient limited by fatigue  [x] Patient limited by pain     [] Patient limited by other medical complications  [] Other:      PLAN: See eval  [] Continue per plan of care [] Alter current plan (see comments above)  [x] Plan of care initiated [] Hold pending MD visit [] Discharge      Electronically signed by:  Stalin Joseph PT    Note: If patient does not return for scheduled/ recommended follow up visits, this note will serve as a discharge from care along with most recent update on progress.

## 2020-09-25 NOTE — PLAN OF CARE
The [address] [place] [address] and [facility]      Physical Therapy Certification    Dear Referring Practitioner: Any Howard MD,    We had the pleasure of evaluating the following patient for physical therapy services at [address] [place] [address]. A summary of our findings can be found in the initial assessment below. This includes our plan of care. If you have any questions or concerns regarding these findings, please do not hesitate to contact me at the office phone number checked above. Thank you for the referral.       Physician Signature:_______________________________Date:__________________  By signing above (or electronic signature), therapists plan is approved by physician      Patient: Jose Roberto Rachel   : 1960   MRN: [id number]  Referring Physician: Referring Practitioner: Any Howard MD      Evaluation Date: 2020      Medical Diagnosis Information:  Diagnosis: s/p Right shoulder SAD/DCE/debridement  DOS: 20  ICD10:M75.41 (ICD-10-CM) - Subacromial impingement, right M75.21 (ICD-10-CM) - Biceps tendinitis of right upper extremity M75.81 (ICD-10-CM) - Right rotator cuff tendinitis   Treatment Diagnosis: decreased ROM; decreased strength; increased pain                                         Insurance information: PT Insurance Information: [employer]    Precautions/ Contra-indications:     C-SSRS Triggered by Intake questionnaire (Past 2 wk assessment):   [x] No, Questionnaire did not trigger screening.   [] Yes, Patient intake triggered further evaluation      [] C-SSRS Screening completed  [] PCP notified via Plan of Care  [] Emergency services notified     Latex Allergy:  [x]NO      []YES  Preferred Language for Healthcare:   [x]English       []other:    SUBJECTIVE: Patient stated complaint: Pt presents to therapy s/p shoulder surgery. He is doing well today.      Relevant Medical History: HTN  Functional Disability Index: UEFI= 0%    Pain Scale: 8/10  Easing factors:  Provocative factors: any movement of the arm. Type: []Constant   [x]Intermittent  []Radiating []Localized []other:     Numbness/Tingling:     Occupation/School:     Living Status/Prior Level of Function: Independent with ADLs and IADLs    OBJECTIVE: NT due to recent surgery    ROM PROM AROM  Comment    L R L R    Flexion        Abduction        ER        IR        Other  (cervical)        Other             Strength L R Comment   Flexion      Abduction      ER      IR      Supraspinatus      Upper Trap      Lower Trap      Mid Trap      Rhomboids      Biceps      Triceps      Horizontal Abduction      Horizontal Adduction      Lats        Special Tests Results/Comment   Archana    Nehosea    Speeds    OBriens    Apprehension     Load & Shift         Reflexes/Sensation:               [x]Dermatomes/Myotomes intact               []Reflexes equal and normal bilaterally               []Other:     Joint mobility:               []Normal               []Hypo              []Hyper     Palpation:      Functional Mobility/Transfers: WFL, without use of surgical arm     Posture: rounded shoulders; guarded posture     Bandages/Dressings/Incisions: bandages removed; wounds are clean and dry     Gait: WNL     Orthopedic Special Tests: NT due to recent surgery                       [x] Patient history, allergies, meds reviewed. Medical chart reviewed. See intake form. Review Of Systems (ROS):  [x]Performed Review of systems (Integumentary, CardioPulmonary, Neurological) by intake and observation. Intake form has been scanned into medical record. Patient has been instructed to contact their primary care physician regarding ROS issues if not already being addressed at this time.        Co-morbidities/Complexities (which will affect course of rehabilitation):   []None              Arthritic conditions   []Rheumatoid arthritis (M05.9)  []Osteoarthritis (M19.91)    Cardiovascular conditions   [x]Hypertension (I10)  []Hyperlipidemia (E78.5)  []Angina pectoris (I20)  []Atherosclerosis (I70)    Musculoskeletal conditions   []Disc pathology   []Congenital spine pathologies   []Prior surgical intervention  []Osteoporosis (M81.8)  []Osteopenia (M85.8)   Endocrine conditions   []Hypothyroid (E03.9)  []Hyperthyroid Gastrointestinal conditions   []Constipation (X72.78)    Metabolic conditions   []Morbid obesity (E66.01)  []Diabetes type 1(E10.65) or 2 (E11.65)   []Neuropathy (G60.9)      Pulmonary conditions   []Asthma (J45)  []Coughing   []COPD (J44.9)    Psychological Disorders  []Anxiety (F41.9)  []Depression (F32.9)   []Other:    []Other:            Barriers to/and or personal factors that will affect rehab potential:              []Age  []Sex              []Motivation/Lack of Motivation                        []Co-Morbidities              []Cognitive Function, education/learning barriers              []Environmental, home barriers              []profession/work barriers  []past PT/medical experience  []other:  Justification: Pt has a good rehab potential.      Falls Risk Assessment (30 days):   [x] Falls Risk assessed and no intervention required.   [] Falls Risk assessed and Patient requires intervention due to being higher risk   TUG score (>12s at risk):     [] Falls education provided, including     ASSESSMENT:   Functional Impairments              []Noted spinal or UE joint hypomobility              []Noted spinal or UE joint hypermobility              [x]Decreased UE functional ROM              [x]Decreased UE functional strength              []Abnormal reflexes/sensation/myotomal/dermatomal deficits              [x]Decreased RC/scapular/core strength and neuromuscular control              []other:       Functional Activity Limitations (from functional questionnaire and intake)              [x]Reduced ability to tolerate prolonged functional positions              [x]Reduced ability or difficulty with changes of positions or transfers between positions              []Reduced ability to maintain good posture and demonstrate good body mechanics with sitting, bending, and lifting              [x] Reduced ability or tolerance with driving and/or computer work              [x]Reduced ability to sleep              [x]Reduced ability to perform lifting, reaching, carrying tasks              [x]Reduced ability to tolerate impact through UE              [x]Reduced ability to reach behind back              []Reduced ability to  or hold objects              []Reduced ability to throw or toss an object              []other:       Participation Restrictions              [x]Reduced participation in self care activities              [x]Reduced participation in home management activities              []Reduced participation in work activities              [x]Reduced participation in social activities. []Reduced participation in sport / recreational activities. Classification:              [x]Signs/symptoms consistent with post-surgical status including decreased ROM, strength and function.   []Signs/symptoms consistent with joint sprain/strain              []Signs/symptoms consistent with shoulder impingement              []Signs/symptoms consistent with shoulder/elbow/wrist tendinopathy              []Signs/symptoms consistent with Rotator cuff tear              []Signs/symptoms consistent with labral tear              []Signs/symptoms consistent with postural dysfunction                         []Signs/symptoms consistent with Glenohumeral IR Deficit - <45 degrees              []Signs/symptoms consistent with facet dysfunction of cervical/thoracic spine                         []Signs/symptoms consistent with pathology which may benefit from Dry needling                []other:      Prognosis/Rehab Potential:                                       []Excellent              [x]Good []Fair              []Poor     Tolerance of evaluation/treatment:               []Excellent              [x]Good                 []Fair              []Poor     Physical Therapy Evaluation Complexity Justification  [x] A history of present problem with:  [x] no personal factors and/or comorbidities that impact the plan of care;  []1-2 personal factors and/or comorbidities that impact the plan of care  []3 personal factors and/or comorbidities that impact the plan of care  [x] An examination of body systems using standardized tests and measures addressing any of the following: body structures and functions (impairments), activity limitations, and/or participation restrictions;:  [] a total of 1-2 or more elements   [x] a total of 3 or more elements   [] a total of 4 or more elements   [x] A clinical presentation with:  [] stable and/or uncomplicated characteristics   [x] evolving clinical presentation with changing characteristics  [] unstable and unpredictable characteristics;   [x] Clinical decision making of [x] low, [] moderate, [] high complexity using standardized patient assessment instrument and/or measurable assessment of functional outcome. [x] EVAL (LOW) 23978 (typically 20 minutes face-to-face)  [] EVAL (MOD) 52624 (typically 30 minutes face-to-face)  [] EVAL (HIGH) 61252 (typically 45 minutes face-to-face)  [] RE-EVAL         PLAN:  Frequency/Duration:  2 days per week for 12 Weeks:  INTERVENTIONS:  [x] Therapeutic exercise including: strength training, ROM, for Upper extremity and core   [x]  NMR activation and proprioception for UE, scap and Core   [x] Manual therapy as indicated for shoulder, scapula and spine to include: Dry Needling/IASTM, STM, PROM, Gr I-IV mobilizations, manipulation.              [x] Modalities as needed that may include: thermal agents, E-stim, Biofeedback, US, iontophoresis as indicated  [x] Patient education on joint protection, postural re-education, activity modification, progression of HEP. GOALS:   Patient stated goal: return to regular activities and fishing. [] Progressing: [] Met: [] Not Met: [] Adjusted    Therapist goals for Patient:   Short Term Goals: To be achieved in: 2 weeks  1. Independent in HEP and progression per patient tolerance, in order to prevent re-injury. [] Progressing: [] Met: [] Not Met: [] Adjusted   2. Patient will have a decrease in pain to facilitate improvement in movement, function, and ADLs as indicated by Functional Deficits. [] Progressing: [] Met: [] Not Met: [] Adjusted    Long Term Goals: To be achieved in: 12 weeks  1. Disability index score of 30% or less for the UEFS to assist with reaching prior level of function. [] Progressing: [] Met: [] Not Met: [] Adjusted  2. Patient will demonstrate increased AROM to [facility] to allow for proper joint functioning as indicated by patients Functional Deficits. [] Progressing: [] Met: [] Not Met: [] Adjusted  3. Patient will demonstrate an increase in strength to good scapular and core control  for UE to allow for proper functional mobility as indicated by patients Functional Deficits. [] Progressing: [] Met: [] Not Met: [] Adjusted  4. Patient will return to ADL and household functional activities without increased symptoms or restriction. [] Progressing: [] Met: [] Not Met: [] Adjusted  5. Pt will return to recreational activities. [] Progressing: [] Met: [] Not Met: [] Adjusted    Electronically signed by:  Tomeka Vidales PT    Note: If patient does not return for scheduled/ recommended follow up visits, this note will serve as a discharge from care along with most recent update on progress.

## 2020-09-25 NOTE — OP NOTE
[place] [address]                     [facility], [address]                                OPERATIVE REPORT    PATIENT NAME: Ghazal Herrera                      :        1960  MED REC NO:   [id number]                          ROOM:  ACCOUNT NO:   [de-identified]                           ADMIT DATE: 2020  PROVIDER:     Laura Figueroa MD    DATE OF PROCEDURE:  2020    PREOPERATIVE DIAGNOSES:  Right shoulder AC arthrosis, chronic  impingement, biceps tendinosis. POSTOPERATIVE DIAGNOSES:  Right shoulder AC arthrosis, chronic  impingement, biceps tendinosis. OPERATION PERFORMED:  Right shoulder debridement of large biceps stump  and labrum, synovectomy glenohumeral joint, subacromial decompression  and distal clavicle excision. SURGEON:  Laura Figueroa MD    ASSISTANT:  _____    INDICATIONS:  This is a 61-year-old male, who has had about a year of  right shoulder pain. He was seen a couple of months ago and at that  point he had tenderness to the [facility] joint. He had positive impingement  signs. No obvious weakness of external rotation and negative belly  press test.  He had injections to the [facility] joint and subacromial space and  physical therapy, which temporarily helped, but his pain returned. He  obtained an MRI, which showed his AC arthrosis and biceps tendinosis and  rotator cuff tendinitis. So, after discussion of risks, benefits and  alternatives, this patient wished to proceed with surgery. OPERATIVE PROCEDURE:  The patient was transported to the operating room. After interscalene block was placed, general anesthesia was induced. The patient was placed in a beach chair position. The arm was prepped  and draped in the sterile fashion. Straight posterior puncture was made  and the glenohumeral joint was viewed.   There was a very large stump  almost the length of the glenoid from top to bottom of biceps stump,  which was interposed between the glenoid and the humerus, an anterior  working portal was established and the large biceps stump was removed. There was villous synovitis throughout much of the glenohumeral joint  and so ablation and a shaver were used to remove this. Once clear  visualization was accomplished, the articular cartilage of the glenoid  and the humerus was in good shape. There was a small fray of just 2 mm  in width of the supraspinatus and the anterior third of the  infraspinatus and this was removed with shaver. There was no  full-thickness tear. There were no loose bodies in the axillary pouch. Subscapularis was intact. Subacromial space was entered and this had very marked synovitis and  scarring, so a lot of time was spent from both posterior and anterior  portals doing a subacromial bursectomy. The bursal side of the cuff was  seen. This was intact. A subacromial decompression was performed. There was a very large hooked acromion with very large inferior  osteophyte at the [facility] joint. Through an anterior portal, distal clavicle  was exposed. This was denuded off cartilage in about two-thirds of its  surface. A britta was used to do distal clavicle excision as well. Bleeding points were coagulated. The skin was closed with 4-0 Monocryl,  Steri-Strips. A sterile dressing was applied and the patient was placed  in a sling. The patient tolerated the procedure well. There were no  known complications. Estimated blood loss was 30 mL.         Nam Price MD    D: 09/24/2020 9:19:48       T: 09/24/2020 14:15:22     [id number]  Job#: [id number]     Doc#: [id number]    CC:

## 2020-09-29 ENCOUNTER — HOSPITAL ENCOUNTER (OUTPATIENT)
Dept: PHYSICAL THERAPY | Age: 60
Setting detail: THERAPIES SERIES
Discharge: HOME | End: 2020-09-29
Payer: MEDICARE

## 2020-09-29 PROCEDURE — 97110 THERAPEUTIC EXERCISES: CPT | Performed by: PHYSICAL THERAPIST

## 2020-09-29 PROCEDURE — 97140 MANUAL THERAPY 1/> REGIONS: CPT | Performed by: PHYSICAL THERAPIST

## 2020-09-29 NOTE — FLOWSHEET NOTE
[facility]    Physical Therapy Daily Treatment Note  Date:  2020    Patient Name:  Kaylah Draper    :  1960  MRN: [id number]  Restrictions/Precautions:    Medical/Treatment Diagnosis Information:  · Diagnosis: s/p Right shoulder SAD/DCE/debridement    · DOS: 20   ·  ICD10:M75.41 (ICD-10-CM) - Subacromial impingement, right M75.21 (ICD-10-CM) - Biceps tendinitis of right upper extremity M75.81 (ICD-10-CM) - Right rotator cuff tendinitis  · Treatment Diagnosis: decreased ROM; decreased strength; increased pain  Insurance/Certification information:  PT Insurance Information: [employer]  Physician Information:  Referring Practitioner: Angel Mcleod MD  Has the plan of care been signed (Y/N):        []  Yes  [x]  No     Date of Patient follow up with Physician:       Is this a Progress Report:     []  Yes  [x]  No        If Yes:  Date Range for reporting period:  Beginnin20  Ending    Progress report will be due (10 Rx or 30 days whichever is less):       Recertification will be due (POC Duration  / 90 days whichever is less): 20         Visit # Insurance Allowable Auth Required   2 No visit limit []  Yes []  No        Functional Scale: UEFI= 0%   Date assessed: 20    Latex Allergy:  [x]NO      []YES  Preferred Language for Healthcare:   [x]English       []other:    Pain level:  5/10     SUBJECTIVE:  Pt states he is doing well and feeling better. He drove to PT today. Pt is doing the exercises. No c/o's today.      OBJECTIVE:    Observation:    Test measurements:    PROM:   Flexion= 100 degrees   Abduction= 90 degrees   ER= 25 degrees scaption     RESTRICTIONS/PRECAUTIONS:     Exercises/Interventions: Right shoulder  Exercises:  Exercise/Equipment Resistance/Repetitions Other comments   Stretching/PROM     Wand     Table Slides FL/ABD    [id number]    UE [place]     Pulleys     Pendulum     Elbow passive ROM Isometrics    Retraction 3x10       Weight shift     Flexion     Abduction     External Rotation     Internal Rotation     Biceps     Triceps          PRE's     Flexion     Abduction     External Rotation     Internal Rotation     Shrugs 3x10    EXT     Reverse Flys     Serratus     Horizontal Abd with ER     Biceps AROM  3x10    Triceps     Retraction          Cable Column/Theraband     External Rotation     Internal Rotation     Shrugs     Lats     Ext     Flex     Scapular Retraction     BIC     TRIC     PNF          Dynamic Stability          Plyoback          Manual interventions     Manual stretching FL/ABD/ER           Patient Education:  Access Code: [email or web address]   URL: [email or web address]   Date: 09/29/2020   Prepared by: Edred Folds     Exercises  Seated Shoulder Flexion Towel Slide at Table Top - 10 reps - 1 sets - 10sec hold - 2x daily - 7x weekly  Seated Shoulder Abduction Towel Slide at Table Top - 10 reps - 1 sets - 10sec hold - 2x daily - 7x weekly  Standing Shoulder Shrugs - 10 reps - 3 sets - 2x daily - 7x weekly  Standing Scapular Retraction - 10 reps - 3 sets - 2x daily - 7x weekly  Standing Bicep Curls Supinated with Dumbbells - 10 reps - 3 sets - 2x daily - 7x weekly    Therapeutic Exercise and NMR EXR  [x] (25677) Provided verbal/tactile cueing for activities related to strengthening, flexibility, endurance, ROM  for improvements in scapular, scapulothoracic and UE control with self care, reaching, carrying, lifting, house/yardwork, driving/computer work.    [] (49075) Provided verbal/tactile cueing for activities related to improving balance, coordination, kinesthetic sense, posture, motor skill, proprioception  to assist with  scapular, scapulothoracic and UE control with self care, reaching, carrying, lifting, house/yardwork, driving/computer work.     Therapeutic Activities:    [] (84723 or 13441) Provided verbal/tactile cueing for activities related to improving balance, coordination, kinesthetic sense, posture, motor skill, proprioception and motor activation to allow for proper function of scapular, scapulothoracic and UE control with self care, carrying, lifting, driving/computer work. Home Exercise Program:    [x] (07470) Reviewed/Progressed HEP activities related to strengthening, flexibility, endurance, ROM of scapular, scapulothoracic and UE control with self care, reaching, carrying, lifting, house/yardwork, driving/computer work  [] (41975) Reviewed/Progressed HEP activities related to improving balance, coordination, kinesthetic sense, posture, motor skill, proprioception of scapular, scapulothoracic and UE control with self care, reaching, carrying, lifting, house/yardwork, driving/computer work      Manual Treatments:  PROM / STM / Oscillations-Mobs:  G-I, II, III, IV (PA's, Inf., Post.)  [x] (43994) Provided manual therapy to mobilize soft tissue/joints of cervical/CT, scapular GHJ and UE for the purpose of modulating pain, promoting relaxation,  increasing ROM, reducing/eliminating soft tissue swelling/inflammation/restriction, improving soft tissue extensibility and allowing for proper ROM for normal function with self care, reaching, carrying, lifting, house/yardwork, driving/computer work    Modalities:  Ice x 15 min    Charges:  Timed Code Treatment Minutes: 35   Total Treatment Minutes: 50       [] EVAL (LOW) 18264 (typically 20 minutes face-to-face)  [] EVAL (MOD) 56660 (typically 30 minutes face-to-face)  [] EVAL (HIGH) 39313 (typically 45 minutes face-to-face)  [] RE-EVAL     [x] RV(07769) x  1   [] IONTO  [] NMR (42163) x     [] VASO  [x] Manual (20033) x  1    [] Other:  [] TA x      [] Mech Traction (08277)  [] ES(attended) (91227)      [] ES (un) (47694):     GOALS:   Patient stated goal: return to regular activities and fishing. []? Progressing: []? Met: []? Not Met: []? Adjusted    Therapist goals for Patient:   Short Term Goals:  To be achieved in: 2 weeks  1. Independent in HEP and progression per patient tolerance, in order to prevent re-injury. []? Progressing: []? Met: []? Not Met: []? Adjusted   2. Patient will have a decrease in pain to facilitate improvement in movement, function, and ADLs as indicated by Functional Deficits. []? Progressing: []? Met: []? Not Met: []? Adjusted     Long Term Goals: To be achieved in: 12 weeks  1. Disability index score of 30% or less for the UEFS to assist with reaching prior level of function. []? Progressing: []? Met: []? Not Met: []? Adjusted  2. Patient will demonstrate increased AROM to [facility] to allow for proper joint functioning as indicated by patients Functional Deficits. []? Progressing: []? Met: []? Not Met: []? Adjusted  3. Patient will demonstrate an increase in strength to good scapular and core control  for UE to allow for proper functional mobility as indicated by patients Functional Deficits. []? Progressing: []? Met: []? Not Met: []? Adjusted  4. Patient will return to ADL and household functional activities without increased symptoms or restriction. []? Progressing: []? Met: []? Not Met: []? Adjusted  5. Pt will return to recreational activities. []? Progressing: []? Met: []? Not Met: []? Adjusted    Overall Progression Towards Functional goals/ Treatment Progress Update:  [] Patient is progressing as expected towards functional goals listed. [] Progression is slowed due to complexities/Impairments listed. [] Progression has been slowed due to co-morbidities.   [x] Plan just implemented, too soon to assess goals progression <30days   [] Goals require adjustment due to lack of progress  [] Patient is not progressing as expected and requires additional follow up with physician  [] Other    Prognosis for POC: [x] Good [] Fair  [] Poor      Patient requires continued skilled intervention: [x] Yes  [] No    Treatment/Activity Tolerance:  [] Patient able to complete treatment  [] Patient limited by fatigue  [x] Patient limited by pain     [] Patient limited by other medical complications  [x] Other: Pt has decreased ROM due to recent surgery. He did demonstrate improvement with PROM today. Advanced HEP without difficulty. PLAN: ROM program.   [x] Continue per plan of care [] Alter current plan (see comments above)  [] Plan of care initiated [] Hold pending MD visit [] Discharge      Electronically signed by:  Selin Gonzalez PT    Note: If patient does not return for scheduled/ recommended follow up visits, this note will serve as a discharge from care along with most recent update on progress.

## 2020-10-06 ENCOUNTER — HOSPITAL ENCOUNTER (OUTPATIENT)
Dept: PHYSICAL THERAPY | Age: 60
Setting detail: THERAPIES SERIES
Discharge: HOME | End: 2020-10-06
Payer: MEDICARE

## 2020-10-06 PROCEDURE — 97140 MANUAL THERAPY 1/> REGIONS: CPT | Performed by: PHYSICAL THERAPIST

## 2020-10-06 PROCEDURE — 97110 THERAPEUTIC EXERCISES: CPT | Performed by: PHYSICAL THERAPIST

## 2020-10-06 NOTE — FLOWSHEET NOTE
[facility]    Physical Therapy Daily Treatment Note  Date:  10/6/2020    Patient Name:  Rodrigo Pimentel    :  1960  MRN: [id number]  Restrictions/Precautions:    Medical/Treatment Diagnosis Information:  · Diagnosis: s/p Right shoulder SAD/DCE/debridement    · DOS: 20   ·  ICD10:M75.41 (ICD-10-CM) - Subacromial impingement, right M75.21 (ICD-10-CM) - Biceps tendinitis of right upper extremity M75.81 (ICD-10-CM) - Right rotator cuff tendinitis  · Treatment Diagnosis: decreased ROM; decreased strength; increased pain  Insurance/Certification information:  PT Insurance Information: [employer]  Physician Information:  Referring Practitioner: Ivania oMrales MD  Has the plan of care been signed (Y/N):        []  Yes  [x]  No     Date of Patient follow up with Physician: MTD      Is this a Progress Report:     []  Yes  [x]  No        If Yes:  Date Range for reporting period:  Beginnin20  Ending    Progress report will be due (10 Rx or 30 days whichever is less):       Recertification will be due (POC Duration  / 90 days whichever is less): 20         Visit # Insurance Allowable Auth Required   3 No visit limit []  Yes []  No        Functional Scale: UEFI= 0%   Date assessed: 20    Latex Allergy:  [x]NO      []YES  Preferred Language for Healthcare:   [x]English       []other:    Pain level:  5/10; pain meds     SUBJECTIVE:  Pt has pain in the anterior shoulder. Soreness with exercises. OBJECTIVE:    Observation: sutures removed today. Wounds are healing well.     Test measurements:    PROM:   Flexion= 110 degrees   Abduction= 90 degrees   ER= 30 degrees scaption    IR= NT    RESTRICTIONS/PRECAUTIONS:     Exercises/Interventions: Right shoulder  Exercises:  Exercise/Equipment Resistance/Repetitions Other comments   Stretching/PROM     Wand     Table Slides FL/ABD    [id number]    UE [place] ER 45 degrees [id number] Pulleys Next visit    Pendulum     IR stretch  Wall walking    Isometrics    Retraction 3x10       Weight shift     Flexion     Abduction     External Rotation     Internal Rotation     Biceps     Triceps          PRE's     Flexion     Abduction     External Rotation     Internal Rotation     Shrugs 2# 3x10    EXT     Reverse Flys     Serratus     Horizontal Abd with ER     Biceps 2#  3x10    Triceps     Retraction          Cable Column/Theraband     External Rotation     Internal Rotation     Shrugs     Lats     Ext     Flex     Scapular Retraction     BIC     TRIC Blue 3x10     PNF          Dynamic Stability          Plyoback          Manual interventions     Manual stretching FL/ABD/ER           Patient Education:  Access Code: [email or web address]   URL: [email or web address]   Date: 10/06/2020   Prepared by: Constance Fothergill     Exercises  Seated Shoulder Flexion Towel Slide at Table Top - 10 reps - 1 sets - 10sec hold - 2x daily - 7x weekly  Seated Shoulder Abduction Towel Slide at Table Top - 10 reps - 1 sets - 10sec hold - 2x daily - 7x weekly  Standing Scapular Retraction - 10 reps - 3 sets - 2x daily - 7x weekly  Supine Shoulder External Rotation in 45 Degrees Abduction AAROM with [place] - 10 reps - 1 sets - 10 sec hold - 2x daily - 7x weekly  Standing Bicep Curls Supinated with Dumbbells - 10 reps - 3 sets - 2x daily - 7x weekly  Standing Shoulder Shrugs with Dumbbells - 10 reps - 3 sets - 2x daily - 7x weekly  Standing Elbow Extension with Anchored Resistance at Wall - 10 reps - 3 sets - 2x daily - 7x weekly    Therapeutic Exercise and NMR EXR  [x] (36482) Provided verbal/tactile cueing for activities related to strengthening, flexibility, endurance, ROM  for improvements in scapular, scapulothoracic and UE control with self care, reaching, carrying, lifting, house/yardwork, driving/computer work.    [] (80018) Provided verbal/tactile cueing for activities related to improving balance, coordination, kinesthetic sense, posture, motor skill, proprioception  to assist with  scapular, scapulothoracic and UE control with self care, reaching, carrying, lifting, house/yardwork, driving/computer work. Therapeutic Activities:    [] (94372 or 28092) Provided verbal/tactile cueing for activities related to improving balance, coordination, kinesthetic sense, posture, motor skill, proprioception and motor activation to allow for proper function of scapular, scapulothoracic and UE control with self care, carrying, lifting, driving/computer work.      Home Exercise Program:    [x] (84667) Reviewed/Progressed HEP activities related to strengthening, flexibility, endurance, ROM of scapular, scapulothoracic and UE control with self care, reaching, carrying, lifting, house/yardwork, driving/computer work  [] (35846) Reviewed/Progressed HEP activities related to improving balance, coordination, kinesthetic sense, posture, motor skill, proprioception of scapular, scapulothoracic and UE control with self care, reaching, carrying, lifting, house/yardwork, driving/computer work      Manual Treatments:  PROM / STM / Oscillations-Mobs:  G-I, II, III, IV (PA's, Inf., Post.)  [x] (78239) Provided manual therapy to mobilize soft tissue/joints of cervical/CT, scapular GHJ and UE for the purpose of modulating pain, promoting relaxation,  increasing ROM, reducing/eliminating soft tissue swelling/inflammation/restriction, improving soft tissue extensibility and allowing for proper ROM for normal function with self care, reaching, carrying, lifting, house/yardwork, driving/computer work    Modalities:  Ice x 15 min    Charges:  Timed Code Treatment Minutes: 45   Total Treatment Minutes: 60       [] EVAL (LOW) 46758 (typically 20 minutes face-to-face)  [] EVAL (MOD) 95915 (typically 30 minutes face-to-face)  [] EVAL (HIGH) 47308 (typically 45 minutes face-to-face)  [] RE-EVAL     [x] ZW(24882) x  2  [] IONTO  [] NMR (95047) x     [] VASO  [x] Manual (08000) x  1    [] Other:  [] TA x      [] Mech Traction (34754)  [] ES(attended) (17082)      [] ES (un) (71787):     GOALS:   Patient stated goal: return to regular activities and fishing. []? Progressing: []? Met: []? Not Met: []? Adjusted    Therapist goals for Patient:   Short Term Goals: To be achieved in: 2 weeks  1. Independent in HEP and progression per patient tolerance, in order to prevent re-injury. []? Progressing: []? Met: []? Not Met: []? Adjusted   2. Patient will have a decrease in pain to facilitate improvement in movement, function, and ADLs as indicated by Functional Deficits. []? Progressing: []? Met: []? Not Met: []? Adjusted     Long Term Goals: To be achieved in: 12 weeks  1. Disability index score of 30% or less for the UEFS to assist with reaching prior level of function. []? Progressing: []? Met: []? Not Met: []? Adjusted  2. Patient will demonstrate increased AROM to [facility] to allow for proper joint functioning as indicated by patients Functional Deficits. []? Progressing: []? Met: []? Not Met: []? Adjusted  3. Patient will demonstrate an increase in strength to good scapular and core control  for UE to allow for proper functional mobility as indicated by patients Functional Deficits. []? Progressing: []? Met: []? Not Met: []? Adjusted  4. Patient will return to ADL and household functional activities without increased symptoms or restriction. []? Progressing: []? Met: []? Not Met: []? Adjusted  5. Pt will return to recreational activities. []? Progressing: []? Met: []? Not Met: []? Adjusted    Overall Progression Towards Functional goals/ Treatment Progress Update:  [] Patient is progressing as expected towards functional goals listed. [] Progression is slowed due to complexities/Impairments listed. [] Progression has been slowed due to co-morbidities.   [x] Plan just implemented, too soon to assess goals progression <30days   [] Goals require adjustment due

## 2020-10-13 ENCOUNTER — HOSPITAL ENCOUNTER (OUTPATIENT)
Dept: PHYSICAL THERAPY | Age: 60
Setting detail: THERAPIES SERIES
Discharge: HOME | End: 2020-10-13
Payer: MEDICARE

## 2020-10-13 PROCEDURE — 97140 MANUAL THERAPY 1/> REGIONS: CPT | Performed by: PHYSICAL THERAPIST

## 2020-10-13 PROCEDURE — 97110 THERAPEUTIC EXERCISES: CPT | Performed by: PHYSICAL THERAPIST

## 2020-10-13 NOTE — FLOWSHEET NOTE
cueing for activities related to strengthening, flexibility, endurance, ROM  for improvements in scapular, scapulothoracic and UE control with self care, reaching, carrying, lifting, house/yardwork, driving/computer work.    [] (87221) Provided verbal/tactile cueing for activities related to improving balance, coordination, kinesthetic sense, posture, motor skill, proprioception  to assist with  scapular, scapulothoracic and UE control with self care, reaching, carrying, lifting, house/yardwork, driving/computer work. Therapeutic Activities:    [] (22184 or 36839) Provided verbal/tactile cueing for activities related to improving balance, coordination, kinesthetic sense, posture, motor skill, proprioception and motor activation to allow for proper function of scapular, scapulothoracic and UE control with self care, carrying, lifting, driving/computer work.      Home Exercise Program:    [x] (27133) Reviewed/Progressed HEP activities related to strengthening, flexibility, endurance, ROM of scapular, scapulothoracic and UE control with self care, reaching, carrying, lifting, house/yardwork, driving/computer work  [] (71232) Reviewed/Progressed HEP activities related to improving balance, coordination, kinesthetic sense, posture, motor skill, proprioception of scapular, scapulothoracic and UE control with self care, reaching, carrying, lifting, house/yardwork, driving/computer work      Manual Treatments:  PROM / STM / Oscillations-Mobs:  G-I, II, III, IV (PA's, Inf., Post.)  [x] (33312) Provided manual therapy to mobilize soft tissue/joints of cervical/CT, scapular GHJ and UE for the purpose of modulating pain, promoting relaxation,  increasing ROM, reducing/eliminating soft tissue swelling/inflammation/restriction, improving soft tissue extensibility and allowing for proper ROM for normal function with self care, reaching, carrying, lifting, house/yardwork, driving/computer work    Modalities:      Charges:  Timed Code Treatment Minutes: 45   Total Treatment Minutes: 45       [] EVAL (LOW) 53326 (typically 20 minutes face-to-face)  [] EVAL (MOD) 76579 (typically 30 minutes face-to-face)  [] EVAL (HIGH) 42314 (typically 45 minutes face-to-face)  [] RE-EVAL     [x] HF(41121) x  2  [] IONTO  [] NMR (16239) x     [] VASO  [x] Manual (25418) x  1    [] Other:  [] TA x      [] Mech Traction (47575)  [] ES(attended) (34053)      [] ES (un) (36700):     GOALS:   Patient stated goal: return to regular activities and fishing. []? Progressing: []? Met: []? Not Met: []? Adjusted    Therapist goals for Patient:   Short Term Goals: To be achieved in: 2 weeks  1. Independent in HEP and progression per patient tolerance, in order to prevent re-injury. []? Progressing: []? Met: []? Not Met: []? Adjusted   2. Patient will have a decrease in pain to facilitate improvement in movement, function, and ADLs as indicated by Functional Deficits. []? Progressing: []? Met: []? Not Met: []? Adjusted     Long Term Goals: To be achieved in: 12 weeks  1. Disability index score of 30% or less for the UEFS to assist with reaching prior level of function. []? Progressing: []? Met: []? Not Met: []? Adjusted  2. Patient will demonstrate increased AROM to [facility] to allow for proper joint functioning as indicated by patients Functional Deficits. []? Progressing: []? Met: []? Not Met: []? Adjusted  3. Patient will demonstrate an increase in strength to good scapular and core control  for UE to allow for proper functional mobility as indicated by patients Functional Deficits. []? Progressing: []? Met: []? Not Met: []? Adjusted  4. Patient will return to ADL and household functional activities without increased symptoms or restriction. []? Progressing: []? Met: []? Not Met: []? Adjusted  5. Pt will return to recreational activities. []? Progressing: []? Met: []? Not Met: []?  Adjusted    Overall Progression Towards Functional goals/ Treatment Progress Update:  [] Patient is progressing as expected towards functional goals listed. [] Progression is slowed due to complexities/Impairments listed. [] Progression has been slowed due to co-morbidities. [x] Plan just implemented, too soon to assess goals progression <30days   [] Goals require adjustment due to lack of progress  [] Patient is not progressing as expected and requires additional follow up with physician  [] Other    Prognosis for POC: [x] Good [] Fair  [] Poor      Patient requires continued skilled intervention: [x] Yes  [] No    Treatment/Activity Tolerance:  [x] Patient able to complete treatment  [] Patient limited by fatigue  [] Patient limited by pain     [] Patient limited by other medical complications  [x] Other: Pt is progressing well with the exercise program. He continues to have ROM limitations, but it is improving. Pt tolerated the program well today. PLAN: ROM and light strengthening program.   [x] Continue per plan of care [] Alter current plan (see comments above)  [] Plan of care initiated [] Hold pending MD visit [] Discharge      Electronically signed by:  Soledad Davies PT    Note: If patient does not return for scheduled/ recommended follow up visits, this note will serve as a discharge from care along with most recent update on progress.

## 2021-04-29 ENCOUNTER — HOSPITAL ENCOUNTER (OUTPATIENT)
Age: 61
Discharge: HOME | End: 2021-04-29
Payer: MEDICARE

## 2021-04-29 ENCOUNTER — HOSPITAL ENCOUNTER (OUTPATIENT)
Dept: GENERAL RADIOLOGY | Age: 61
Discharge: HOME | End: 2021-04-29
Payer: MEDICARE

## 2021-04-29 DIAGNOSIS — R52 PAIN: ICD-10-CM

## 2021-04-29 PROCEDURE — 73130 X-RAY EXAM OF HAND: CPT

## 2022-02-21 ENCOUNTER — APPOINTMENT (OUTPATIENT)
Dept: CT IMAGING | Age: 62
End: 2022-02-21
Payer: MEDICARE

## 2022-02-21 ENCOUNTER — HOSPITAL ENCOUNTER (EMERGENCY)
Age: 62
Discharge: HOME | End: 2022-02-21
Attending: EMERGENCY MEDICINE
Payer: MEDICARE

## 2022-02-21 VITALS
SYSTOLIC BLOOD PRESSURE: 130 MMHG | HEIGHT: 65 IN | DIASTOLIC BLOOD PRESSURE: 95 MMHG | TEMPERATURE: 98.7 F | HEART RATE: 82 BPM | WEIGHT: 267 LBS | OXYGEN SATURATION: 99 % | RESPIRATION RATE: 22 BRPM | BODY MASS INDEX: 44.48 KG/M2

## 2022-02-21 DIAGNOSIS — M54.9 EXACERBATION OF CHRONIC BACK PAIN: ICD-10-CM

## 2022-02-21 DIAGNOSIS — G89.29 EXACERBATION OF CHRONIC BACK PAIN: ICD-10-CM
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
## 2022-02-21 LAB
A/G RATIO: 1.3 (ref 1.1–2.2)
ALBUMIN SERPL-MCNC: 4.3 G/DL (ref 3.4–5)
ALP BLD-CCNC: 89 U/L (ref 40–129)
ALT SERPL-CCNC: 17 U/L (ref 10–40)
ANION GAP SERPL CALCULATED.3IONS-SCNC: 10 MMOL/L (ref 3–16)
APTT: 32.4 SEC (ref 26.2–38.6)
AST SERPL-CCNC: 19 U/L (ref 15–37)
BASE EXCESS VENOUS: 1.7 MMOL/L (ref -2–3)
BASOPHILS ABSOLUTE: 0.1 K/UL (ref 0–0.2)
BASOPHILS RELATIVE PERCENT: 0.7 %
BILIRUB SERPL-MCNC: 0.3 MG/DL (ref 0–1)
BUN BLDV-MCNC: 10 MG/DL (ref 7–20)
CALCIUM SERPL-MCNC: 9.6 MG/DL (ref 8.3–10.6)
CARBOXYHEMOGLOBIN: 5.1 % (ref 0–1.5)
CHLORIDE BLD-SCNC: 102 MMOL/L (ref 99–110)
CO2: 25 MMOL/L (ref 21–32)
CREAT SERPL-MCNC: 0.8 MG/DL (ref 0.8–1.3)
EOSINOPHILS ABSOLUTE: 0.1 K/UL (ref 0–0.6)
EOSINOPHILS RELATIVE PERCENT: 1.5 %
GFR AFRICAN AMERICAN: >60
GFR NON-AFRICAN AMERICAN: >60
GLUCOSE BLD-MCNC: 122 MG/DL (ref 70–99)
HCO3 VENOUS: 27 MMOL/L (ref 24–28)
HCT VFR BLD CALC: 43.1 % (ref 40.5–52.5)
HEMOGLOBIN, VEN, REDUCED: 16.6 %
HEMOGLOBIN: 15 G/DL (ref 13.5–17.5)
INR BLD: 1.1 (ref 0.88–1.12)
LACTIC ACID: 1.3 MMOL/L (ref 0.4–2)
LYMPHOCYTES ABSOLUTE: 1.9 K/UL (ref 1–5.1)
LYMPHOCYTES RELATIVE PERCENT: 22.8 %
MCH RBC QN AUTO: 27.5 PG (ref 26–34)
MCHC RBC AUTO-ENTMCNC: 34.8 G/DL (ref 31–36)
MCV RBC AUTO: 79.1 FL (ref 80–100)
METHEMOGLOBIN VENOUS: 0.3 % (ref 0–1.5)
MONOCYTES ABSOLUTE: 0.8 K/UL (ref 0–1.3)
MONOCYTES RELATIVE PERCENT: 9 %
NEUTROPHILS ABSOLUTE: 5.6 K/UL (ref 1.7–7.7)
NEUTROPHILS RELATIVE PERCENT: 66 %
O2 SAT, VEN: 82 %
PCO2, VEN: 43.9 MMHG (ref 41–51)
PDW BLD-RTO: 14.1 % (ref 12.4–15.4)
PH VENOUS: 7.4 (ref 7.35–7.45)
PLATELET # BLD: 252 K/UL (ref 135–450)
PMV BLD AUTO: 8.2 FL (ref 5–10.5)
PO2, VEN: 45.1 MMHG (ref 25–40)
POTASSIUM REFLEX MAGNESIUM: 4 MMOL/L (ref 3.5–5.1)
PROTHROMBIN TIME: 12.5 SEC (ref 9.9–12.7)
RBC # BLD: 5.44 M/UL (ref 4.2–5.9)
SODIUM BLD-SCNC: 137 MMOL/L (ref 136–145)
TCO2 CALC VENOUS: 28 MMOL/L
TOTAL PROTEIN: 7.5 G/DL (ref 6.4–8.2)
WBC # BLD: 8.5 K/UL (ref 4–11)

## 2022-02-21 PROCEDURE — 76376 3D RENDER W/INTRP POSTPROCES: CPT

## 2022-02-21 PROCEDURE — 80053 COMPREHEN METABOLIC PANEL: CPT

## 2022-02-21 PROCEDURE — 2580000003 HC RX 258: Performed by: EMERGENCY MEDICINE

## 2022-02-21 PROCEDURE — 99284 EMERGENCY DEPT VISIT MOD MDM: CPT

## 2022-02-21 PROCEDURE — 96375 TX/PRO/DX INJ NEW DRUG ADDON: CPT

## 2022-02-21 PROCEDURE — 70450 CT HEAD/BRAIN W/O DYE: CPT

## 2022-02-21 PROCEDURE — 85025 COMPLETE CBC W/AUTO DIFF WBC: CPT

## 2022-02-21 PROCEDURE — 71260 CT THORAX DX C+: CPT

## 2022-02-21 PROCEDURE — 6370000000 HC RX 637 (ALT 250 FOR IP): Performed by: EMERGENCY MEDICINE

## 2022-02-21 PROCEDURE — 6360000002 HC RX W HCPCS: Performed by: EMERGENCY MEDICINE

## 2022-02-21 PROCEDURE — 6360000004 HC RX CONTRAST MEDICATION: Performed by: EMERGENCY MEDICINE

## 2022-02-21 PROCEDURE — 83605 ASSAY OF LACTIC ACID: CPT

## 2022-02-21 PROCEDURE — 96365 THER/PROPH/DIAG IV INF INIT: CPT

## 2022-02-21 PROCEDURE — 85610 PROTHROMBIN TIME: CPT

## 2022-02-21 PROCEDURE — 85730 THROMBOPLASTIN TIME PARTIAL: CPT

## 2022-02-21 PROCEDURE — 82803 BLOOD GASES ANY COMBINATION: CPT

## 2022-02-21 PROCEDURE — 72125 CT NECK SPINE W/O DYE: CPT

## 2022-02-21 RX ORDER — NAPROXEN 500 MG/1
500 TABLET ORAL 2 TIMES DAILY WITH MEALS
Qty: 28 TABLET | Refills: 0 | Status: SHIPPED | OUTPATIENT
Start: 2022-02-21

## 2022-02-21 RX ORDER — METHOCARBAMOL 750 MG/1
750 TABLET, FILM COATED ORAL 4 TIMES DAILY
Qty: 40 TABLET | Refills: 0 | Status: SHIPPED | OUTPATIENT
Start: 2022-02-21 | End: 2022-03-03

## 2022-02-21 RX ORDER — MORPHINE SULFATE 4 MG/ML
4 INJECTION, SOLUTION INTRAMUSCULAR; INTRAVENOUS ONCE
Status: COMPLETED | OUTPATIENT
Start: 2022-02-21 | End: 2022-02-21

## 2022-02-21 RX ORDER — ONDANSETRON 2 MG/ML
4 INJECTION INTRAMUSCULAR; INTRAVENOUS ONCE
Status: COMPLETED | OUTPATIENT
Start: 2022-02-21 | End: 2022-02-21

## 2022-02-21 RX ORDER — OXYCODONE HYDROCHLORIDE AND ACETAMINOPHEN 5; 325 MG/1; MG/1
2 TABLET ORAL ONCE
Status: COMPLETED | OUTPATIENT
Start: 2022-02-21 | End: 2022-02-21

## 2022-02-21 RX ORDER — KETOROLAC TROMETHAMINE 30 MG/ML
15 INJECTION, SOLUTION INTRAMUSCULAR; INTRAVENOUS ONCE
Status: COMPLETED | OUTPATIENT
Start: 2022-02-21 | End: 2022-02-21

## 2022-02-21 RX ORDER — OXYCODONE HYDROCHLORIDE AND ACETAMINOPHEN 5; 325 MG/1; MG/1
1 TABLET ORAL EVERY 6 HOURS PRN
Qty: 12 TABLET | Refills: 0 | Status: SHIPPED | OUTPATIENT
Start: 2022-02-21 | End: 2022-02-24

## 2022-02-21 RX ADMIN — MORPHINE SULFATE 4 MG: 4 INJECTION INTRAVENOUS at 16:28

## 2022-02-21 RX ADMIN — METHOCARBAMOL 1000 MG: 100 INJECTION, SOLUTION INTRAMUSCULAR; INTRAVENOUS at 19:46

## 2022-02-21 RX ADMIN — OXYCODONE HYDROCHLORIDE AND ACETAMINOPHEN 2 TABLET: 5; 325 TABLET ORAL at 21:02

## 2022-02-21 RX ADMIN — ONDANSETRON 4 MG: 2 INJECTION INTRAMUSCULAR; INTRAVENOUS at 16:27

## 2022-02-21 RX ADMIN — IOPAMIDOL 80 ML: 755 INJECTION, SOLUTION INTRAVENOUS at 17:54

## 2022-02-21 RX ADMIN — KETOROLAC TROMETHAMINE 15 MG: 30 INJECTION, SOLUTION INTRAMUSCULAR at 19:30

## 2022-02-21 ASSESSMENT — PAIN SCALES - GENERAL
PAINLEVEL_OUTOF10: 8
PAINLEVEL_OUTOF10: 10

## 2022-02-21 ASSESSMENT — PAIN DESCRIPTION - ORIENTATION: ORIENTATION: LEFT

## 2022-02-21 ASSESSMENT — PAIN - FUNCTIONAL ASSESSMENT: PAIN_FUNCTIONAL_ASSESSMENT: 0-10

## 2022-02-21 ASSESSMENT — PAIN DESCRIPTION - PAIN TYPE: TYPE: ACUTE PAIN

## 2022-02-21 ASSESSMENT — PAIN DESCRIPTION - LOCATION: LOCATION: CHEST;ABDOMEN;RIB CAGE

## 2022-02-21 ASSESSMENT — PAIN DESCRIPTION - FREQUENCY: FREQUENCY: CONTINUOUS

## 2022-02-21 ASSESSMENT — PAIN DESCRIPTION - DESCRIPTORS: DESCRIPTORS: DULL
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
## 2022-02-21 NOTE — ED NOTES
Pt complains of pain with inhalation. Pt complains of left sided pain.      Nunu Tavera RN  02/21/22 [id number]

## 2022-02-22 NOTE — ED NOTES
Patient prepared for and ready to be discharged. Dressed in clothes and given belongings. IV removed, pt tolerated well, no complications. Patient discharged at this time in no acute distress after he verbalized understanding of discharge instructions. Reviewed medications, and when to return to the ED with patient. Encouraged follow up with PCP  Patient walked to lobby, Family to take patient home.        Eliud Rai RN  02/21/22 [id number]

## 2022-03-15 ENCOUNTER — HOSPITAL ENCOUNTER (OUTPATIENT)
Age: 62
Discharge: HOME | End: 2022-03-15
Payer: MEDICARE

## 2022-03-15 ENCOUNTER — HOSPITAL ENCOUNTER (OUTPATIENT)
Dept: GENERAL RADIOLOGY | Age: 62
Discharge: HOME | End: 2022-03-15
Payer: MEDICARE

## 2022-03-15 DIAGNOSIS — R10.9 ACUTE ABDOMINAL PAIN: ICD-10-CM

## 2022-03-15 DIAGNOSIS — R07.9 CHEST PAIN, UNSPECIFIED TYPE: ICD-10-CM

## 2022-03-15 PROCEDURE — 71111 X-RAY EXAM RIBS/CHEST4/> VWS: CPT

## 2022-03-15 PROCEDURE — 74018 RADEX ABDOMEN 1 VIEW: CPT

## 2022-06-24 ENCOUNTER — HOSPITAL ENCOUNTER (OUTPATIENT)
Dept: CT IMAGING | Age: 62
Discharge: HOME | End: 2022-06-24
Payer: MEDICARE

## 2022-06-24 DIAGNOSIS — S30.1XXA ABDOMINAL WALL HEMATOMA, INITIAL ENCOUNTER: ICD-10-CM

## 2022-06-24 DIAGNOSIS — R10.9 ABDOMINAL PAIN, UNSPECIFIED ABDOMINAL LOCATION: ICD-10-CM

## 2022-06-24 PROCEDURE — 74150 CT ABDOMEN W/O CONTRAST: CPT

## 2023-04-03 NOTE — PROGRESS NOTES
tenderness at the midline. Lumbar paraspinal tenderness: Mild L4/5 and L5/S1 tenderness  Bursal tenderness No tenderness bilaterally  There is no paraspinal spasm. · Range of Motion: limited by 50% in all planes due to pain  · Strength:   Strength testing is 5/5 in all muscle groups tested. · Special Tests:   Straight leg raise positive left and negative right and crossed SLR negative. Vignesh's testing is negative bilaterally. FADIR's testing is negative bilaterally. Bowstring test negative. Slump test negative. · Skin: There are no rashes, ulcerations or lesions. · Reflexes: Reflexes are symmetrically 2+ at the patellar and ankle tendons. Clonus absent bilaterally at the feet. · Gait & station: uses a single point cane to ambulate and no ataxia  · Additional Examinations:  · RIGHT LOWER EXTREMITY: Inspection/examination of the right lower extremity does not show any tenderness, deformity or injury. Range of motion is normal and pain-free. There is no gross instability. There are no rashes, ulcerations or lesions. Strength and tone are normal. No atrophy or abnormal movements are noted. · LEFT LOWER EXTREMITY:  Inspection/examination of the left lower extremity does not show any tenderness, deformity or injury. Range of motion is normal and pain-free. There is no gross instability. There are no rashes, ulcerations or lesions. Strength and tone are normal. No atrophy or abnormal movements are noted.       Diagnostic Testing:    MR Lumbar spine shows from 7/22/19:   FINDINGS:   BONES/ALIGNMENT: There is normal alignment of the lumbar spine.  There is no   acute fracture or listhesis.  Bone marrow signal intensity is normal.   Intervertebral disc height and signal is normal.  There is no spondylolysis.       SPINAL CORD: The conus medullaris is normal in size and signal intensities   and terminates normally.       SOFT TISSUES: There is no paraspinal mass identified.       L1-L2: There is no disc bulge or Detail Level: Simple

## 2023-04-25 ENCOUNTER — OFFICE VISIT (OUTPATIENT)
Dept: ORTHOPEDIC SURGERY | Age: 63
End: 2023-04-25
Payer: MEDICARE

## 2023-04-25 VITALS — WEIGHT: 267 LBS | HEIGHT: 65 IN | BODY MASS INDEX: 44.48 KG/M2

## 2023-04-25 DIAGNOSIS — M25.511 RIGHT SHOULDER PAIN, UNSPECIFIED CHRONICITY: Primary | ICD-10-CM

## 2023-04-25 PROCEDURE — G8427 DOCREV CUR MEDS BY ELIG CLIN: HCPCS | Performed by: ORTHOPAEDIC SURGERY

## 2023-04-25 PROCEDURE — G8417 CALC BMI ABV UP PARAM F/U: HCPCS | Performed by: ORTHOPAEDIC SURGERY

## 2023-04-25 PROCEDURE — 99204 OFFICE O/P NEW MOD 45 MIN: CPT | Performed by: ORTHOPAEDIC SURGERY

## 2023-04-25 PROCEDURE — 1036F TOBACCO NON-USER: CPT | Performed by: ORTHOPAEDIC SURGERY

## 2023-04-25 PROCEDURE — 3017F COLORECTAL CA SCREEN DOC REV: CPT | Performed by: ORTHOPAEDIC SURGERY

## 2023-04-25 RX ORDER — METHYLPREDNISOLONE 4 MG/1
TABLET ORAL
Qty: 1 KIT | Refills: 0 | Status: SHIPPED | OUTPATIENT
Start: 2023-04-25

## 2023-04-26 NOTE — PROGRESS NOTES
4/25/2023     Reason for visit:  Right shoulder pain    History of Present Illness: The patient is a 43-year-old male who presents for evaluation of his right shoulder. He reports on and off pain for quite some time with recent worsening. No traumatic injury. He localized the pain diffuse about the shoulder. Its made worse with overactivity, reaching, and lifting. He also has pain at night while sleeping. Of note, he previously underwent a right knee arthroscopy which sounds like just a debridement and subacromial decompression 3 years ago.     Medical History:  Past Medical History:   Diagnosis Date    Acid reflux     Back pain     Hypertension     Sleep apnea     no cpap    Spinal stenosis     Tinnitus     right ear mostly      Past Surgical History:   Procedure Laterality Date    LUMBAR SPINE SURGERY Left 8/12/2019    LEFT L4 AND L5 TRANSFORAMINAL EPIDURAL STEROID INJECTION WITH FLUOROSCOPY performed by Kirsten Hess MD at [place] [address] Left 8/26/2019    LEFT L4 AND L5 TRANSFORAMINAL EPIDURAL STEROID INJECTION WITH FLUOROSCOPY performed by Kirsten Hess MD at [address] [place] [address] Left 2/12/2020    LEFT L4 AND L5 TRANSFORAMINAL EPDIURAL STEROID INJECTION WITH FLUOROSCOPY (04009, [id number]) performed by Kirsten Hess MD at [address] [place] [address] ARTHROSCOPY Right 9/24/2020    RIGHT SHOULDER SCOPE; SUBACROMIAL DECOMPRESSION; DISTAL CLAVICLE EXCISION; BICEPS TENOTOMY ([id number]) performed by Johnell Nageotte, MD at [facility]      Family History   Problem Relation Age of Onset    High Blood Pressure Mother     Diabetes Mother       Social History     Socioeconomic History    Marital status: Legally      Spouse name: Not on file    Number of children: Not on file    Years of education: Not on file    Highest education level: Not on file   Occupational History    Not on file   Tobacco Use    Smoking status: Former     Packs/day: 1.00     Years:

## 2023-05-16 ENCOUNTER — HOSPITAL ENCOUNTER (OUTPATIENT)
Dept: MRI IMAGING | Age: 63
Discharge: HOME | End: 2023-05-16
Payer: MEDICARE

## 2023-05-16 DIAGNOSIS — M25.511 RIGHT SHOULDER PAIN, UNSPECIFIED CHRONICITY: ICD-10-CM

## 2023-05-16 PROCEDURE — 73221 MRI JOINT UPR EXTREM W/O DYE: CPT

## 2023-06-13 ENCOUNTER — OFFICE VISIT (OUTPATIENT)
Dept: ORTHOPEDIC SURGERY | Age: 63
End: 2023-06-13

## 2023-06-13 VITALS — HEIGHT: 65 IN | BODY MASS INDEX: 43.99 KG/M2 | WEIGHT: 264 LBS

## 2023-06-13 DIAGNOSIS — M25.511 RIGHT SHOULDER PAIN, UNSPECIFIED CHRONICITY: Primary | ICD-10-CM

## 2023-06-13 RX ORDER — METHYLPREDNISOLONE ACETATE 40 MG/ML
40 INJECTION, SUSPENSION INTRA-ARTICULAR; INTRALESIONAL; INTRAMUSCULAR; SOFT TISSUE ONCE
Status: COMPLETED | OUTPATIENT
Start: 2023-06-13 | End: 2023-06-13

## 2023-06-13 RX ORDER — BUPIVACAINE HYDROCHLORIDE 5 MG/ML
4 INJECTION, SOLUTION PERINEURAL ONCE
Status: COMPLETED | OUTPATIENT
Start: 2023-06-13 | End: 2023-06-13

## 2023-06-13 RX ADMIN — METHYLPREDNISOLONE ACETATE 40 MG: 40 INJECTION, SUSPENSION INTRA-ARTICULAR; INTRALESIONAL; INTRAMUSCULAR; SOFT TISSUE at 15:36

## 2023-06-13 RX ADMIN — BUPIVACAINE HYDROCHLORIDE 20 MG: 5 INJECTION, SOLUTION PERINEURAL at 15:35

## 2023-06-20 NOTE — PROGRESS NOTES
cuff with partial thickness tendon tearing    Plan:  I discussed with the patient the diagnosis and treatment options. We discussed operative and nonoperative management. At this point I do recommend nonoperative management. Nonoperative treatment options include activity modification, anti-inflammatory medications, physical therapy, and injections. The patient elected proceed with cortisone injection. Therefore injection was given to the right subacromial space via sterile technique. The injection consisted of 40 mg of Depo-Medrol combined with 4 mL of 0.5% Marcaine. The patient tolerated this well. We will also initiate physical therapy. The patient return in 4 weeks to assess his response. Greater than 30 minutes were spent with this encounter. Time spent included evaluating the patient's chart prior to arrival.  Evaluating the patient in the office including history, physical examination, imaging reviewing, and counseling on next steps. Lastly, time was spent discussing orders with my staff as well as providing documentation in the chart. Satya Andrade MD            [facility] and [address] [facility] Physician [facility]      Disclaimer: This note was dictated with voice recognition software. Though review and correction are routine, we apologize for any errors.

## (undated) DEVICE — DRAPE,U/ SHT,SPLIT,PLAS,STERIL: Brand: MEDLINE

## (undated) DEVICE — SYRINGE MED 3ML CLR PLAS STD N CTRL LUERLOCK TIP DISP

## (undated) DEVICE — Device: Brand: JELCO

## (undated) DEVICE — PEN: MARKING STD 100/CS: Brand: MEDICAL ACTION INDUSTRIES

## (undated) DEVICE — 3M™ IOBAN™ 2 ANTIMICROBIAL INCISE DRAPE 6650EZ: Brand: IOBAN™ 2

## (undated) DEVICE — SET EXTN L7IN PRIMING VOL 0.5ML PRSS RATE STD BOR 1 REM

## (undated) DEVICE — STERILE POLYISOPRENE POWDER-FREE SURGICAL GLOVES: Brand: PROTEXIS

## (undated) DEVICE — WEREWOLF FLOW 90 COBLATION WAND: Brand: COBLATION

## (undated) DEVICE — MAT FLR ABS DISP

## (undated) DEVICE — 6 ML SYRINGE LUER-LOCK TIP: Brand: MONOJECT

## (undated) DEVICE — CHLORAPREP 26ML ORANGE

## (undated) DEVICE — Device

## (undated) DEVICE — T-MAX DISPOSABLE FACE MASK 8 PER BOX

## (undated) DEVICE — DISPOSABLE OR TOWEL: Brand: CARDINAL HEALTH

## (undated) DEVICE — BOWL MED L 32OZ PLAS W/ MOLD GRAD EZ OPN PEEL PCH

## (undated) DEVICE — NEEDLE SPNL 22GA L5IN BLK HUB S STL W/ QNCKE PNT W/OUT

## (undated) DEVICE — 3M™ STERI-STRIP™ REINFORCED ADHESIVE SKIN CLOSURES, R1547, 1/2 IN X 4 IN (12 MM X 100 MM), 6 STRIPS/ENVELOPE: Brand: 3M™ STERI-STRIP™

## (undated) DEVICE — 1010 S-DRAPE TOWEL DRAPE 10/BX: Brand: STERI-DRAPE™

## (undated) DEVICE — NEEDLE FLTR 19GA L1.5IN WALL THK5UM BRN POLYPR HUB S STL

## (undated) DEVICE — 5.5 MM ELITE ACROMIOBLASTER                                    STRAIGHT DISPOSABLE BURRS, BRICK                                    RED, 10000 MAXIMUM RPM, PACKAGED 6                                    PER BOX, STERILE

## (undated) DEVICE — UNIVERSAL BLOCK TRAY: Brand: AVANOS*

## (undated) DEVICE — 3M™ STERI-DRAPE™ U-DRAPE 1067 1067 5/BX 4BX/CS/CTN&#X20;: Brand: STERI-DRAPE™

## (undated) DEVICE — CANNULA ARTHSCP L7CM DIA7MM TRNSLUC THRD FLX W/ NO SQUIRT

## (undated) DEVICE — GAUZE,SPONGE,4"X4",8PLY,STRL,LF,10/TRAY: Brand: MEDLINE

## (undated) DEVICE — MEDIA CONTRAST RX ISOVUE-300 61% 30ML VIALS

## (undated) DEVICE — DYONICS 25 DAY TUBE SET MUST BE                                    USED WITH 7211008, 3 PER BOX

## (undated) DEVICE — SUTURE MCRYL SZ 4-0 L27IN ABSRB UD L19MM PS-2 1/2 CIR PRIM Y426H

## (undated) DEVICE — STANDARD HYPODERMIC NEEDLE,POLYPROPYLENE HUB: Brand: MONOJECT

## (undated) DEVICE — HYPODERMIC SAFETY NEEDLE: Brand: MAGELLAN

## (undated) DEVICE — GOWN,PREVENTION PLUS,XLN/XL,ST,24/CS: Brand: MEDLINE

## (undated) DEVICE — DYONICS 25 PATIENT TUBE SET MUST                                    BE USED WITH 7211007, 12 PER BOX

## (undated) DEVICE — INCISOR PLUS PLATINUM 4.5 MM BLADE: Brand: DYONICS INCISOR

## (undated) DEVICE — SUTURE VCRL SZ 2-0 L36IN ABSRB UD L36MM CT-1 1/2 CIR J945H

## (undated) DEVICE — Z INACTIVE USE 2660664 SOLUTION IRRIG 3000ML 0.9% SOD CHL USP UROMATIC PLAS CONT

## (undated) DEVICE — GLOVE ORANGE PI 8   MSG9080

## (undated) DEVICE — SHEET,DRAPE,53X77,STERILE: Brand: MEDLINE

## (undated) DEVICE — PACK PROCEDURE SURG SHLDR MFFOP CUST

## (undated) DEVICE — SHOULDER STABILIZATION KIT,                                    DISPOSABLE 12 PER BOX